# Patient Record
Sex: MALE | Race: WHITE | NOT HISPANIC OR LATINO | Employment: FULL TIME | URBAN - METROPOLITAN AREA
[De-identification: names, ages, dates, MRNs, and addresses within clinical notes are randomized per-mention and may not be internally consistent; named-entity substitution may affect disease eponyms.]

---

## 2019-01-18 ENCOUNTER — OFFICE VISIT (OUTPATIENT)
Dept: URGENT CARE | Facility: CLINIC | Age: 34
End: 2019-01-18
Payer: COMMERCIAL

## 2019-01-18 VITALS
HEIGHT: 73 IN | OXYGEN SATURATION: 96 % | DIASTOLIC BLOOD PRESSURE: 70 MMHG | TEMPERATURE: 98.7 F | HEART RATE: 80 BPM | RESPIRATION RATE: 16 BRPM | BODY MASS INDEX: 24.12 KG/M2 | SYSTOLIC BLOOD PRESSURE: 116 MMHG | WEIGHT: 182 LBS

## 2019-01-18 DIAGNOSIS — J02.0 STREP PHARYNGITIS: ICD-10-CM

## 2019-01-18 DIAGNOSIS — J10.1 INFLUENZA B: ICD-10-CM

## 2019-01-18 LAB
FLUAV+FLUBV AG SPEC QL IA: NORMAL
INT CON NEG: NORMAL
INT CON NEG: NORMAL
INT CON POS: NORMAL
INT CON POS: NORMAL
S PYO AG THROAT QL: POSITIVE

## 2019-01-18 PROCEDURE — 99203 OFFICE O/P NEW LOW 30 MIN: CPT | Performed by: PHYSICIAN ASSISTANT

## 2019-01-18 PROCEDURE — 87880 STREP A ASSAY W/OPTIC: CPT | Performed by: PHYSICIAN ASSISTANT

## 2019-01-18 PROCEDURE — 87804 INFLUENZA ASSAY W/OPTIC: CPT | Performed by: PHYSICIAN ASSISTANT

## 2019-01-18 RX ORDER — AMOXICILLIN 400 MG/5ML
POWDER, FOR SUSPENSION ORAL
Qty: 1 BOTTLE | Refills: 0 | Status: SHIPPED | OUTPATIENT
Start: 2019-01-18

## 2019-01-18 ASSESSMENT — ENCOUNTER SYMPTOMS
HEADACHES: 0
WHEEZING: 0
DIARRHEA: 0
VOMITING: 0
CHILLS: 1
EYE DISCHARGE: 0
SORE THROAT: 1
COUGH: 1
RHINORRHEA: 1
NECK PAIN: 0
FEVER: 1
DIZZINESS: 0
MYALGIAS: 1
EYE REDNESS: 0

## 2019-01-18 NOTE — PROGRESS NOTES
"Subjective:      Rafael Perla is a 33 y.o. male who presents with Cough (cough, bodyaches, post nasal drip, chills, sore throat x 4 days)            Patient is a pleasant 33-year-old male who presents to urgent care with sore throat, body aches, drainage and subjective fevers for the last 4-5 days.  Patient reports that most of his symptoms are tolerable other than his worsening sore throat at this time.  Patient's been taking over-the-counter DayQuil and cold formulations with minimal relief his symptoms.  He denies any ill contacts at this time and denies receiving his influenza vaccination.      Cough   This is a new problem. The current episode started in the past 7 days. The problem has been unchanged. The problem occurs every few minutes. The cough is non-productive. Associated symptoms include chills, a fever, myalgias, nasal congestion, postnasal drip, rhinorrhea and a sore throat. Pertinent negatives include no chest pain, eye redness, headaches, rash or wheezing. Nothing aggravates the symptoms. Treatments tried: As above.  There is no history of asthma or bronchitis.       Review of Systems   Constitutional: Positive for chills, fever and malaise/fatigue.   HENT: Positive for congestion, postnasal drip, rhinorrhea and sore throat. Negative for ear discharge.    Eyes: Negative for discharge and redness.   Respiratory: Positive for cough. Negative for wheezing.    Cardiovascular: Negative for chest pain.   Gastrointestinal: Negative for diarrhea and vomiting.   Genitourinary: Negative for dysuria.   Musculoskeletal: Positive for myalgias. Negative for neck pain.   Skin: Negative for itching and rash.   Neurological: Negative for dizziness and headaches.   All other systems reviewed and are negative.         Objective:     /70 (BP Location: Right arm, Patient Position: Sitting, BP Cuff Size: Adult)   Pulse 80   Temp 37.1 °C (98.7 °F) (Temporal)   Resp 16   Ht 1.854 m (6' 1\")   Wt 82.6 kg (182 lb)   " SpO2 96%   BMI 24.01 kg/m²    PMH:  has no past medical history on file.  MEDS: No current outpatient prescriptions on file.  ALLERGIES: No Known Allergies  SURGHX: No past surgical history on file.  SOCHX:  reports that he has never smoked. He has never used smokeless tobacco.  FH: Family history was reviewed, no pertinent findings to report    Physical Exam   Constitutional: He is oriented to person, place, and time. He appears well-developed and well-nourished.   HENT:   Head: Normocephalic and atraumatic.   Right Ear: External ear normal.   Left Ear: External ear normal.   Nose: Nose normal.   Mouth/Throat: No oropharyngeal exudate.   Posterior oropharynx with tonsillar edema and erythema without exudate.   Without evidence of abscess formation.    Eyes: Pupils are equal, round, and reactive to light. EOM are normal.   Neck: Normal range of motion. Neck supple. No thyromegaly present.   Cardiovascular: Normal rate and regular rhythm.    Pulmonary/Chest: Effort normal and breath sounds normal. No respiratory distress.   Musculoskeletal: Normal range of motion. He exhibits no edema.   Lymphadenopathy:     He has cervical adenopathy.   Neurological: He is alert and oriented to person, place, and time.   Skin: Skin is warm. No rash noted. No pallor.   Psychiatric: He has a normal mood and affect. His behavior is normal.   Vitals reviewed.            Strep and influenza- positive- Flu B.     Assessment/Plan:     1. Influenza B  - amoxicillin (AMOXIL) 400 MG/5ML suspension; Take 12.5 mL po  BID for 10 days  Dispense: 1 Bottle; Refill: 0    2. Strep pharyngitis  - amoxicillin (AMOXIL) 400 MG/5ML suspension; Take 12.5 mL po  BID for 10 days  Dispense: 1 Bottle; Refill: 0    Encouraged OTC supportive meds PRN. Humidification, increase fluids, avoid night time dairy.   Discussed the notable contagious nature of both illness at this time.  Encourage patient to keep healthy distance from other members of the family at this  time.  Change his toothbrush encouraged good hand hygiene.  Patient given precautionary s/sx that mandate immediate follow up and evaluation in the ED. Advised of risks of not doing so.    DDX, Supportive care, and indications for immediate follow-up discussed with patient.    Instructed to return to clinic or nearest emergency department if we are not available for any change in condition, further concerns, or worsening of symptoms.    The patient demonstrated a good understanding and agreed with the treatment plan.

## 2020-01-21 ENCOUNTER — APPOINTMENT (OUTPATIENT)
Dept: RADIOLOGY | Facility: MEDICAL CENTER | Age: 35
End: 2020-01-21
Attending: EMERGENCY MEDICINE
Payer: COMMERCIAL

## 2020-01-21 ENCOUNTER — HOSPITAL ENCOUNTER (EMERGENCY)
Facility: MEDICAL CENTER | Age: 35
End: 2020-01-21
Attending: EMERGENCY MEDICINE
Payer: COMMERCIAL

## 2020-01-21 VITALS
BODY MASS INDEX: 22.45 KG/M2 | TEMPERATURE: 97.3 F | HEART RATE: 69 BPM | WEIGHT: 180.56 LBS | OXYGEN SATURATION: 98 % | HEIGHT: 75 IN | DIASTOLIC BLOOD PRESSURE: 77 MMHG | SYSTOLIC BLOOD PRESSURE: 107 MMHG | RESPIRATION RATE: 16 BRPM

## 2020-01-21 DIAGNOSIS — M79.645 PAIN OF LEFT THUMB: ICD-10-CM

## 2020-01-21 PROCEDURE — 73140 X-RAY EXAM OF FINGER(S): CPT | Mod: LT

## 2020-01-21 PROCEDURE — 99283 EMERGENCY DEPT VISIT LOW MDM: CPT

## 2020-01-21 ASSESSMENT — PAIN DESCRIPTION - DESCRIPTORS: DESCRIPTORS: ACHING;BURNING
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
## 2020-01-21 NOTE — ED TRIAGE NOTES
.  Chief Complaint   Patient presents with   • Other     stung by sea urchin 1/9   • Hand Pain     left hand thumb     Ambulated to triage with above c.c. left thumb pain and swelling. Has not improved.

## 2021-05-23 ENCOUNTER — APPOINTMENT (OUTPATIENT)
Dept: RADIOLOGY | Facility: MEDICAL CENTER | Age: 36
DRG: 513 | End: 2021-05-23
Attending: EMERGENCY MEDICINE
Payer: COMMERCIAL

## 2021-05-23 ENCOUNTER — HOSPITAL ENCOUNTER (INPATIENT)
Facility: MEDICAL CENTER | Age: 36
LOS: 7 days | DRG: 513 | End: 2021-05-30
Attending: EMERGENCY MEDICINE | Admitting: SURGERY
Payer: COMMERCIAL

## 2021-05-23 DIAGNOSIS — S22.041A: ICD-10-CM

## 2021-05-23 DIAGNOSIS — S22.32XA CLOSED FRACTURE OF ONE RIB OF LEFT SIDE, INITIAL ENCOUNTER: ICD-10-CM

## 2021-05-23 DIAGNOSIS — S12.000A CLOSED DISPLACED FRACTURE OF FIRST CERVICAL VERTEBRA, UNSPECIFIED FRACTURE MORPHOLOGY, INITIAL ENCOUNTER (HCC): ICD-10-CM

## 2021-05-23 DIAGNOSIS — S62.91XA RIGHT HAND FRACTURE, CLOSED, INITIAL ENCOUNTER: ICD-10-CM

## 2021-05-23 DIAGNOSIS — S22.23XA: ICD-10-CM

## 2021-05-23 DIAGNOSIS — J93.9 PNEUMOTHORAX, UNSPECIFIED TYPE: ICD-10-CM

## 2021-05-23 DIAGNOSIS — T14.90XA TRAUMA: ICD-10-CM
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
## 2021-05-23 PROBLEM — R33.8 ACUTE URINARY RETENTION: Status: ACTIVE | Noted: 2021-05-23

## 2021-05-23 PROBLEM — S27.2XXA PNEUMOHEMOTHORAX, TRAUMATIC, INITIAL ENCOUNTER: Status: ACTIVE | Noted: 2021-05-23

## 2021-05-23 PROBLEM — S62.306A: Status: ACTIVE | Noted: 2021-05-23

## 2021-05-23 PROBLEM — S15.102A: Status: ACTIVE | Noted: 2021-05-23

## 2021-05-23 PROBLEM — Z75.8 DISCHARGE PLANNING ISSUES: Status: ACTIVE | Noted: 2021-05-23

## 2021-05-23 PROBLEM — S62.346A NONDISPLACED FRACTURE OF BASE OF FIFTH METACARPAL BONE, RIGHT HAND, INITIAL ENCOUNTER FOR CLOSED FRACTURE: Status: ACTIVE | Noted: 2021-05-23

## 2021-05-23 PROBLEM — S22.42XA TRAUMATIC FRACTURE OF RIBS WITH PNEUMOTHORAX, LEFT, CLOSED, INITIAL ENCOUNTER: Status: ACTIVE | Noted: 2021-05-23

## 2021-05-23 PROBLEM — Z11.9 SCREENING EXAMINATION FOR INFECTIOUS DISEASE: Status: ACTIVE | Noted: 2021-05-23

## 2021-05-23 PROBLEM — Z53.09 CONTRAINDICATION TO DEEP VEIN THROMBOSIS (DVT) PROPHYLAXIS: Status: ACTIVE | Noted: 2021-05-23

## 2021-05-23 PROBLEM — S22.21XA FRACTURE OF MANUBRIUM, INITIAL ENCOUNTER FOR CLOSED FRACTURE: Status: ACTIVE | Noted: 2021-05-23

## 2021-05-23 PROBLEM — S27.0XXA TRAUMATIC FRACTURE OF RIBS WITH PNEUMOTHORAX, LEFT, CLOSED, INITIAL ENCOUNTER: Status: ACTIVE | Noted: 2021-05-23

## 2021-05-23 PROBLEM — S12.9XXA: Status: ACTIVE | Noted: 2021-05-23

## 2021-05-23 PROBLEM — S22.009A MULT FRACTURES OF THORACIC SPINE, CLOSED, INITIAL ENCOUNTER (HCC): Status: ACTIVE | Noted: 2021-05-23

## 2021-05-23 PROBLEM — Z02.9 DISCHARGE PLANNING ISSUES: Status: ACTIVE | Noted: 2021-05-23

## 2021-05-23 LAB
ABO GROUP BLD: NORMAL
ALBUMIN SERPL BCP-MCNC: 4.3 G/DL (ref 3.2–4.9)
ALBUMIN/GLOB SERPL: 1.4 G/DL
ALP SERPL-CCNC: 71 U/L (ref 30–99)
ALT SERPL-CCNC: 26 U/L (ref 2–50)
ANION GAP SERPL CALC-SCNC: 13 MMOL/L (ref 7–16)
APTT PPP: 24.4 SEC (ref 24.7–36)
AST SERPL-CCNC: 35 U/L (ref 12–45)
BILIRUB SERPL-MCNC: 0.3 MG/DL (ref 0.1–1.5)
BLD GP AB SCN SERPL QL: NORMAL
BUN SERPL-MCNC: 12 MG/DL (ref 8–22)
CALCIUM SERPL-MCNC: 8.8 MG/DL (ref 8.5–10.5)
CHLORIDE SERPL-SCNC: 107 MMOL/L (ref 96–112)
CO2 SERPL-SCNC: 18 MMOL/L (ref 20–33)
CREAT SERPL-MCNC: 0.89 MG/DL (ref 0.5–1.4)
ERYTHROCYTE [DISTWIDTH] IN BLOOD BY AUTOMATED COUNT: 42.5 FL (ref 35.9–50)
ETHANOL BLD-MCNC: <10.1 MG/DL (ref 0–10)
GLOBULIN SER CALC-MCNC: 3 G/DL (ref 1.9–3.5)
GLUCOSE SERPL-MCNC: 124 MG/DL (ref 65–99)
HCT VFR BLD AUTO: 45.5 % (ref 42–52)
HGB BLD-MCNC: 15.4 G/DL (ref 14–18)
INR PPP: 0.99 (ref 0.87–1.13)
MCH RBC QN AUTO: 31.4 PG (ref 27–33)
MCHC RBC AUTO-ENTMCNC: 33.8 G/DL (ref 33.7–35.3)
MCV RBC AUTO: 92.9 FL (ref 81.4–97.8)
PLATELET # BLD AUTO: 301 K/UL (ref 164–446)
PMV BLD AUTO: 9.8 FL (ref 9–12.9)
POTASSIUM SERPL-SCNC: 3.8 MMOL/L (ref 3.6–5.5)
PROT SERPL-MCNC: 7.3 G/DL (ref 6–8.2)
PROTHROMBIN TIME: 13.4 SEC (ref 12–14.6)
RBC # BLD AUTO: 4.9 M/UL (ref 4.7–6.1)
RH BLD: NORMAL
SARS-COV+SARS-COV-2 AG RESP QL IA.RAPID: NOTDETECTED
SODIUM SERPL-SCNC: 138 MMOL/L (ref 135–145)
SPECIMEN SOURCE: NORMAL
WBC # BLD AUTO: 19 K/UL (ref 4.8–10.8)

## 2021-05-23 PROCEDURE — 96375 TX/PRO/DX INJ NEW DRUG ADDON: CPT

## 2021-05-23 PROCEDURE — 85610 PROTHROMBIN TIME: CPT

## 2021-05-23 PROCEDURE — 70450 CT HEAD/BRAIN W/O DYE: CPT

## 2021-05-23 PROCEDURE — 96374 THER/PROPH/DIAG INJ IV PUSH: CPT

## 2021-05-23 PROCEDURE — 700111 HCHG RX REV CODE 636 W/ 250 OVERRIDE (IP): Performed by: EMERGENCY MEDICINE

## 2021-05-23 PROCEDURE — 87426 SARSCOV CORONAVIRUS AG IA: CPT

## 2021-05-23 PROCEDURE — 73130 X-RAY EXAM OF HAND: CPT | Mod: RT

## 2021-05-23 PROCEDURE — L0200 CERV COL SUPP ADJ BAR & THOR: HCPCS

## 2021-05-23 PROCEDURE — 86850 RBC ANTIBODY SCREEN: CPT

## 2021-05-23 PROCEDURE — 70486 CT MAXILLOFACIAL W/O DYE: CPT

## 2021-05-23 PROCEDURE — 90715 TDAP VACCINE 7 YRS/> IM: CPT | Performed by: EMERGENCY MEDICINE

## 2021-05-23 PROCEDURE — 86901 BLOOD TYPING SEROLOGIC RH(D): CPT

## 2021-05-23 PROCEDURE — 72128 CT CHEST SPINE W/O DYE: CPT

## 2021-05-23 PROCEDURE — 700117 HCHG RX CONTRAST REV CODE 255: Performed by: EMERGENCY MEDICINE

## 2021-05-23 PROCEDURE — 700102 HCHG RX REV CODE 250 W/ 637 OVERRIDE(OP): Performed by: SURGERY

## 2021-05-23 PROCEDURE — 3E0234Z INTRODUCTION OF SERUM, TOXOID AND VACCINE INTO MUSCLE, PERCUTANEOUS APPROACH: ICD-10-PCS | Performed by: EMERGENCY MEDICINE

## 2021-05-23 PROCEDURE — A9270 NON-COVERED ITEM OR SERVICE: HCPCS | Performed by: SURGERY

## 2021-05-23 PROCEDURE — U0005 INFEC AGEN DETEC AMPLI PROBE: HCPCS

## 2021-05-23 PROCEDURE — 700105 HCHG RX REV CODE 258: Performed by: SURGERY

## 2021-05-23 PROCEDURE — 85027 COMPLETE CBC AUTOMATED: CPT

## 2021-05-23 PROCEDURE — G0390 TRAUMA RESPONS W/HOSP CRITI: HCPCS

## 2021-05-23 PROCEDURE — 85730 THROMBOPLASTIN TIME PARTIAL: CPT

## 2021-05-23 PROCEDURE — 80053 COMPREHEN METABOLIC PANEL: CPT

## 2021-05-23 PROCEDURE — 99291 CRITICAL CARE FIRST HOUR: CPT

## 2021-05-23 PROCEDURE — 82077 ASSAY SPEC XCP UR&BREATH IA: CPT

## 2021-05-23 PROCEDURE — 51798 US URINE CAPACITY MEASURE: CPT

## 2021-05-23 PROCEDURE — U0003 INFECTIOUS AGENT DETECTION BY NUCLEIC ACID (DNA OR RNA); SEVERE ACUTE RESPIRATORY SYNDROME CORONAVIRUS 2 (SARS-COV-2) (CORONAVIRUS DISEASE [COVID-19]), AMPLIFIED PROBE TECHNIQUE, MAKING USE OF HIGH THROUGHPUT TECHNOLOGIES AS DESCRIBED BY CMS-2020-01-R: HCPCS

## 2021-05-23 PROCEDURE — 306637 HCHG MISC ORTHO ITEM RC 0274

## 2021-05-23 PROCEDURE — 86900 BLOOD TYPING SEROLOGIC ABO: CPT

## 2021-05-23 PROCEDURE — 770022 HCHG ROOM/CARE - ICU (200)

## 2021-05-23 PROCEDURE — 700111 HCHG RX REV CODE 636 W/ 250 OVERRIDE (IP): Performed by: SURGERY

## 2021-05-23 PROCEDURE — 72131 CT LUMBAR SPINE W/O DYE: CPT

## 2021-05-23 PROCEDURE — 70498 CT ANGIOGRAPHY NECK: CPT

## 2021-05-23 PROCEDURE — 71045 X-RAY EXAM CHEST 1 VIEW: CPT

## 2021-05-23 PROCEDURE — 71260 CT THORAX DX C+: CPT

## 2021-05-23 PROCEDURE — 72125 CT NECK SPINE W/O DYE: CPT

## 2021-05-23 RX ORDER — BISACODYL 10 MG
10 SUPPOSITORY, RECTAL RECTAL
Status: DISCONTINUED | OUTPATIENT
Start: 2021-05-23 | End: 2021-05-30 | Stop reason: HOSPADM

## 2021-05-23 RX ORDER — ONDANSETRON 2 MG/ML
4 INJECTION INTRAMUSCULAR; INTRAVENOUS EVERY 4 HOURS PRN
Status: DISCONTINUED | OUTPATIENT
Start: 2021-05-23 | End: 2021-05-30 | Stop reason: HOSPADM

## 2021-05-23 RX ORDER — FAMOTIDINE 20 MG/1
20 TABLET, FILM COATED ORAL 2 TIMES DAILY
Status: DISCONTINUED | OUTPATIENT
Start: 2021-05-23 | End: 2021-05-28

## 2021-05-23 RX ORDER — POLYETHYLENE GLYCOL 3350 17 G/17G
1 POWDER, FOR SOLUTION ORAL 2 TIMES DAILY
Status: DISCONTINUED | OUTPATIENT
Start: 2021-05-23 | End: 2021-05-30 | Stop reason: HOSPADM

## 2021-05-23 RX ORDER — AMOXICILLIN 250 MG
1 CAPSULE ORAL NIGHTLY
Status: DISCONTINUED | OUTPATIENT
Start: 2021-05-23 | End: 2021-05-30 | Stop reason: HOSPADM

## 2021-05-23 RX ORDER — OXYCODONE HYDROCHLORIDE 5 MG/1
5 TABLET ORAL
Status: DISCONTINUED | OUTPATIENT
Start: 2021-05-23 | End: 2021-05-24

## 2021-05-23 RX ORDER — ENEMA 19; 7 G/133ML; G/133ML
1 ENEMA RECTAL
Status: DISCONTINUED | OUTPATIENT
Start: 2021-05-23 | End: 2021-05-30 | Stop reason: HOSPADM

## 2021-05-23 RX ORDER — ACETAMINOPHEN 500 MG
1000 TABLET ORAL EVERY 6 HOURS PRN
Status: DISCONTINUED | OUTPATIENT
Start: 2021-05-28 | End: 2021-05-30 | Stop reason: HOSPADM

## 2021-05-23 RX ORDER — ONDANSETRON 2 MG/ML
INJECTION INTRAMUSCULAR; INTRAVENOUS
Status: COMPLETED | OUTPATIENT
Start: 2021-05-23 | End: 2021-05-23

## 2021-05-23 RX ORDER — ACETAMINOPHEN 500 MG
1000 TABLET ORAL EVERY 6 HOURS
Status: DISPENSED | OUTPATIENT
Start: 2021-05-23 | End: 2021-05-28

## 2021-05-23 RX ORDER — DOCUSATE SODIUM 100 MG/1
100 CAPSULE, LIQUID FILLED ORAL 2 TIMES DAILY
Status: DISCONTINUED | OUTPATIENT
Start: 2021-05-23 | End: 2021-05-30 | Stop reason: HOSPADM

## 2021-05-23 RX ORDER — AMOXICILLIN 250 MG
1 CAPSULE ORAL
Status: DISCONTINUED | OUTPATIENT
Start: 2021-05-23 | End: 2021-05-30 | Stop reason: HOSPADM

## 2021-05-23 RX ORDER — OXYCODONE HYDROCHLORIDE 10 MG/1
10 TABLET ORAL
Status: DISCONTINUED | OUTPATIENT
Start: 2021-05-23 | End: 2021-05-24

## 2021-05-23 RX ORDER — DIAZEPAM 5 MG/1
5 TABLET ORAL EVERY 6 HOURS PRN
Status: DISCONTINUED | OUTPATIENT
Start: 2021-05-23 | End: 2021-05-24

## 2021-05-23 RX ORDER — SODIUM CHLORIDE, SODIUM LACTATE, POTASSIUM CHLORIDE, CALCIUM CHLORIDE 600; 310; 30; 20 MG/100ML; MG/100ML; MG/100ML; MG/100ML
INJECTION, SOLUTION INTRAVENOUS CONTINUOUS
Status: DISCONTINUED | OUTPATIENT
Start: 2021-05-23 | End: 2021-05-24

## 2021-05-23 RX ORDER — HYDROMORPHONE HYDROCHLORIDE 1 MG/ML
0.5 INJECTION, SOLUTION INTRAMUSCULAR; INTRAVENOUS; SUBCUTANEOUS
Status: DISCONTINUED | OUTPATIENT
Start: 2021-05-23 | End: 2021-05-24

## 2021-05-23 RX ORDER — MORPHINE SULFATE 4 MG/ML
INJECTION, SOLUTION INTRAMUSCULAR; INTRAVENOUS
Status: COMPLETED | OUTPATIENT
Start: 2021-05-23 | End: 2021-05-23

## 2021-05-23 RX ADMIN — ACETAMINOPHEN 1000 MG: 500 TABLET ORAL at 17:16

## 2021-05-23 RX ADMIN — ACETAMINOPHEN 1000 MG: 500 TABLET ORAL at 23:41

## 2021-05-23 RX ADMIN — ONDANSETRON 4 MG: 2 INJECTION INTRAMUSCULAR; INTRAVENOUS at 13:05

## 2021-05-23 RX ADMIN — MORPHINE SULFATE 4 MG: 4 INJECTION INTRAVENOUS at 13:05

## 2021-05-23 RX ADMIN — DIAZEPAM 5 MG: 5 TABLET ORAL at 17:17

## 2021-05-23 RX ADMIN — HYDROMORPHONE HYDROCHLORIDE 0.5 MG: 1 INJECTION, SOLUTION INTRAMUSCULAR; INTRAVENOUS; SUBCUTANEOUS at 20:41

## 2021-05-23 RX ADMIN — ONDANSETRON 4 MG: 2 INJECTION INTRAMUSCULAR; INTRAVENOUS at 17:35

## 2021-05-23 RX ADMIN — HYDROMORPHONE HYDROCHLORIDE 0.5 MG: 1 INJECTION, SOLUTION INTRAMUSCULAR; INTRAVENOUS; SUBCUTANEOUS at 17:17

## 2021-05-23 RX ADMIN — OXYCODONE HYDROCHLORIDE 10 MG: 10 TABLET ORAL at 15:00

## 2021-05-23 RX ADMIN — DOCUSATE SODIUM 50 MG AND SENNOSIDES 8.6 MG 1 TABLET: 8.6; 5 TABLET, FILM COATED ORAL at 20:41

## 2021-05-23 RX ADMIN — HYDROMORPHONE HYDROCHLORIDE 0.5 MG: 1 INJECTION, SOLUTION INTRAMUSCULAR; INTRAVENOUS; SUBCUTANEOUS at 14:13

## 2021-05-23 RX ADMIN — OXYCODONE HYDROCHLORIDE 10 MG: 10 TABLET ORAL at 23:41

## 2021-05-23 RX ADMIN — IOHEXOL 60 ML: 350 INJECTION, SOLUTION INTRAVENOUS at 14:52

## 2021-05-23 RX ADMIN — IOHEXOL 100 ML: 350 INJECTION, SOLUTION INTRAVENOUS at 13:25

## 2021-05-23 RX ADMIN — SODIUM CHLORIDE, POTASSIUM CHLORIDE, SODIUM LACTATE AND CALCIUM CHLORIDE: 600; 310; 30; 20 INJECTION, SOLUTION INTRAVENOUS at 22:05

## 2021-05-23 RX ADMIN — SODIUM CHLORIDE, POTASSIUM CHLORIDE, SODIUM LACTATE AND CALCIUM CHLORIDE: 600; 310; 30; 20 INJECTION, SOLUTION INTRAVENOUS at 15:00

## 2021-05-23 RX ADMIN — CLOSTRIDIUM TETANI TOXOID ANTIGEN (FORMALDEHYDE INACTIVATED), CORYNEBACTERIUM DIPHTHERIAE TOXOID ANTIGEN (FORMALDEHYDE INACTIVATED), BORDETELLA PERTUSSIS TOXOID ANTIGEN (GLUTARALDEHYDE INACTIVATED), BORDETELLA PERTUSSIS FILAMENTOUS HEMAGGLUTININ ANTIGEN (FORMALDEHYDE INACTIVATED), BORDETELLA PERTUSSIS PERTACTIN ANTIGEN, AND BORDETELLA PERTUSSIS FIMBRIAE 2/3 ANTIGEN 0.5 ML: 5; 2; 2.5; 5; 3; 5 INJECTION, SUSPENSION INTRAMUSCULAR at 15:51

## 2021-05-23 RX ADMIN — FAMOTIDINE 20 MG: 20 TABLET ORAL at 17:16

## 2021-05-23 ASSESSMENT — COGNITIVE AND FUNCTIONAL STATUS - GENERAL
TOILETING: TOTAL
MOVING TO AND FROM BED TO CHAIR: UNABLE
HELP NEEDED FOR BATHING: TOTAL
SUGGESTED CMS G CODE MODIFIER MOBILITY: CN
PERSONAL GROOMING: TOTAL
MOBILITY SCORE: 6
EATING MEALS: TOTAL
DAILY ACTIVITIY SCORE: 6
TURNING FROM BACK TO SIDE WHILE IN FLAT BAD: UNABLE
SUGGESTED CMS G CODE MODIFIER DAILY ACTIVITY: CN
STANDING UP FROM CHAIR USING ARMS: TOTAL
WALKING IN HOSPITAL ROOM: TOTAL
DRESSING REGULAR LOWER BODY CLOTHING: TOTAL
MOVING FROM LYING ON BACK TO SITTING ON SIDE OF FLAT BED: UNABLE
CLIMB 3 TO 5 STEPS WITH RAILING: TOTAL
DRESSING REGULAR UPPER BODY CLOTHING: TOTAL

## 2021-05-23 ASSESSMENT — ENCOUNTER SYMPTOMS
BACK PAIN: 1
CONSTITUTIONAL NEGATIVE: 1
NECK PAIN: 1
GASTROINTESTINAL NEGATIVE: 1
PSYCHIATRIC NEGATIVE: 1
EYES NEGATIVE: 1
RESPIRATORY NEGATIVE: 1
NEUROLOGICAL NEGATIVE: 1

## 2021-05-23 ASSESSMENT — LIFESTYLE VARIABLES
HAVE PEOPLE ANNOYED YOU BY CRITICIZING YOUR DRINKING: NO
EVER FELT BAD OR GUILTY ABOUT YOUR DRINKING: NO
EVER_SMOKED: NEVER
ON A TYPICAL DAY WHEN YOU DRINK ALCOHOL HOW MANY DRINKS DO YOU HAVE: 3
HAVE YOU EVER FELT YOU SHOULD CUT DOWN ON YOUR DRINKING: NO
TOTAL SCORE: 0
CONSUMPTION TOTAL: POSITIVE
AVERAGE NUMBER OF DAYS PER WEEK YOU HAVE A DRINK CONTAINING ALCOHOL: 3
HOW MANY TIMES IN THE PAST YEAR HAVE YOU HAD 5 OR MORE DRINKS IN A DAY: 10
EVER HAD A DRINK FIRST THING IN THE MORNING TO STEADY YOUR NERVES TO GET RID OF A HANGOVER: NO
TOTAL SCORE: 0
ALCOHOL_USE: YES
TOTAL SCORE: 0
DOES PATIENT WANT TO STOP DRINKING: NO

## 2021-05-23 ASSESSMENT — PATIENT HEALTH QUESTIONNAIRE - PHQ9
SUM OF ALL RESPONSES TO PHQ9 QUESTIONS 1 AND 2: 0
2. FEELING DOWN, DEPRESSED, IRRITABLE, OR HOPELESS: NOT AT ALL
1. LITTLE INTEREST OR PLEASURE IN DOING THINGS: NOT AT ALL

## 2021-05-23 ASSESSMENT — FIBROSIS 4 INDEX: FIB4 SCORE: 0.82

## 2021-05-23 ASSESSMENT — PAIN DESCRIPTION - PAIN TYPE
TYPE: ACUTE PAIN

## 2021-05-23 ASSESSMENT — COPD QUESTIONNAIRES
COPD SCREENING SCORE: 0
HAVE YOU SMOKED AT LEAST 100 CIGARETTES IN YOUR ENTIRE LIFE: NO/DON'T KNOW
DO YOU EVER COUGH UP ANY MUCUS OR PHLEGM?: NO/ONLY WITH OCCASIONAL COLDS OR INFECTIONS
DURING THE PAST 4 WEEKS HOW MUCH DID YOU FEEL SHORT OF BREATH: NONE/LITTLE OF THE TIME

## 2021-05-23 NOTE — ED NOTES
Pt transported to [place] [address] from CT scanner.  Hard C collar intact.  Pt c/o midline neck pain.  No numbness/tingling in all extremities.

## 2021-05-23 NOTE — PROGRESS NOTES
Will leave formal consult note  Will order  brace, patient needs to wear  at all times  Ok for q4h neurochecks and to mobilize once in brace

## 2021-05-23 NOTE — ASSESSMENT & PLAN NOTE
Prophylactic anticoagulation for thrombotic prevention initially contraindicated secondary to elevated bleeding risk.  5/24 Trauma screening bilateral lower extremity venous duplex negative for above knee DVT.   5/26 Prophylactic Lovenox initiated.

## 2021-05-23 NOTE — ASSESSMENT & PLAN NOTE
Nondisplaced manubrial fracture with mild retrosternal mediastinal hemorrhage.  Aggressive multimodal pain management and pulmonary hygiene. Serial chest radiographs.

## 2021-05-23 NOTE — PROGRESS NOTES
1550: traction at bedside for splinting of right hand    1600: RN contacted [facility], will be in within the next hour and a half for  placement.    1620: Dr Bojorquez notified of pt's muscle spasms. Orders received for valium 5 mg PO q6h PRN.

## 2021-05-23 NOTE — ASSESSMENT & PLAN NOTE
Nondisplaced left anterior second rib fracture.  Aggressive multimodal pain management and pulmonary hygiene. Serial chest radiographs.

## 2021-05-23 NOTE — CARE PLAN
Problem: Pain - Standard  Goal: Alleviation of pain or a reduction in pain to the patient’s comfort goal  Flowsheets (Taken 5/23/2021 6673)  Pain Rating Scale (NPRS): 8  Note: Patient medicated per MAR     Problem: Skin Integrity  Goal: Skin integrity is maintained or improved  Outcome: Progressing  Note: Patient turned q2h   The patient is Watcher - Medium risk of patient condition declining or worsening
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
## 2021-05-23 NOTE — ASSESSMENT & PLAN NOTE
Admission CTA imaging demonstrated focal irregularity of the distal left vertebral artery at the C1-C2 level.  No discrete flap identified.  Per neurosurgery normal variant and no dissection
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
## 2021-05-23 NOTE — ASSESSMENT & PLAN NOTE
Mildly angulated fifth metacarpal neck fracture.  Ulnar gutter splint.  5/27 Closed reduction and percutaneous intramedullary fixation of the right fifth metacarpal shaft.  Weight bearing status - Nonweightbearing PANDA Retana MD. Orthopedic Surgeon. [facility].

## 2021-05-23 NOTE — ASSESSMENT & PLAN NOTE
Fractures of the bilateral bilateral pedicles of T1.  Acute burst fracture of T4 with mild retropulsion and 50% height loss. There are associated fractures of the posterior elements as well.   Mild to moderate acute superior endplate compression fracture of T5.  Tiana SCANLON  at all times for 3 months, may wear [place] for showering.   Follow-up in clinic in 6 weeks with AP and lateral x-rays of his cervical and thoracic spine.  He will eventually need CT scans of the cervical and thoracic spine at 3 months prior to clearing his orthotic devices.  Jemal Alcantara MD. Neurosurgeon. [facility].

## 2021-05-23 NOTE — PROGRESS NOTES
Custom order was placed. To check the status of the order or if you have any questions, please call [facility] at [phone].

## 2021-05-23 NOTE — PROGRESS NOTES
Pt arrived to [place] [address] at 1452.   Temp: 97.0F   Weight: 190 lbs  C-spine, log roll precautions maintained during transition to ICU trauma bed.    2 RN skin check completed   -dried blood to nasal/oral areas  -tongue laceration  -right handed swelling and brusing
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
## 2021-05-23 NOTE — ASSESSMENT & PLAN NOTE
Acute mildly displaced fractures of the bilateral posterior arch of C1, acute comminuted displaced fractures of the body of C2 involving the bilateral articular facets, and bilateral transverse foramina, and acute mildly displaced fractures of the right pedicle of C7.  Admission CTA imaging demonstrated focal irregularity of the distal left vertebral artery at the C1-C2 level.  No discrete flap identified.  Non-operative management.   Tiana SCANLON  at all times for 3 months, may wear [place] for showering.  Follow-up in clinic in 6 weeks with AP and lateral x-rays of his cervical and thoracic spine.  He will eventually need CT scans of the cervical and thoracic spine at 3 months prior to clearing his orthotic devices.   Jemal Alcantara MD. Neurosurgeon. [facility].

## 2021-05-23 NOTE — ASSESSMENT & PLAN NOTE
Tiny left pneumothorax.  Chest tube not required at time of admission  5/24 Chest x-ray with small left apical pneumothorax    5/26 Pneumothorax increased in size. Chest tube placed.  5/28 CXR without pneumothorax. CT to water seal this afternoon.   5/29 CXR with tiny apical pneumothorax. CT clamped. Repeat CXR in 4-6 hours.   - Chest tube removed  5/30 No signs of pneumothorax.

## 2021-05-24 ENCOUNTER — APPOINTMENT (OUTPATIENT)
Dept: RADIOLOGY | Facility: MEDICAL CENTER | Age: 36
DRG: 513 | End: 2021-05-24
Attending: SURGERY
Payer: COMMERCIAL

## 2021-05-24 ENCOUNTER — HOSPITAL ENCOUNTER (INPATIENT)
Facility: REHABILITATION | Age: 36
End: 2021-05-24
Attending: PHYSICAL MEDICINE & REHABILITATION | Admitting: PHYSICAL MEDICINE & REHABILITATION
Payer: COMMERCIAL

## 2021-05-24 LAB
ABO + RH BLD: NORMAL
ALBUMIN SERPL BCP-MCNC: 3.7 G/DL (ref 3.2–4.9)
ALBUMIN/GLOB SERPL: 1.5 G/DL
ALP SERPL-CCNC: 57 U/L (ref 30–99)
ALT SERPL-CCNC: 18 U/L (ref 2–50)
ANION GAP SERPL CALC-SCNC: 8 MMOL/L (ref 7–16)
AST SERPL-CCNC: 25 U/L (ref 12–45)
BASOPHILS # BLD AUTO: 0.2 % (ref 0–1.8)
BASOPHILS # BLD: 0.02 K/UL (ref 0–0.12)
BILIRUB SERPL-MCNC: 0.7 MG/DL (ref 0.1–1.5)
BUN SERPL-MCNC: 8 MG/DL (ref 8–22)
CALCIUM SERPL-MCNC: 8.4 MG/DL (ref 8.5–10.5)
CHLORIDE SERPL-SCNC: 105 MMOL/L (ref 96–112)
CO2 SERPL-SCNC: 22 MMOL/L (ref 20–33)
CREAT SERPL-MCNC: 0.84 MG/DL (ref 0.5–1.4)
EOSINOPHIL # BLD AUTO: 0.02 K/UL (ref 0–0.51)
EOSINOPHIL NFR BLD: 0.2 % (ref 0–6.9)
ERYTHROCYTE [DISTWIDTH] IN BLOOD BY AUTOMATED COUNT: 42.7 FL (ref 35.9–50)
GLOBULIN SER CALC-MCNC: 2.5 G/DL (ref 1.9–3.5)
GLUCOSE SERPL-MCNC: 130 MG/DL (ref 65–99)
HCT VFR BLD AUTO: 39.8 % (ref 42–52)
HGB BLD-MCNC: 13.7 G/DL (ref 14–18)
IMM GRANULOCYTES # BLD AUTO: 0.07 K/UL (ref 0–0.11)
IMM GRANULOCYTES NFR BLD AUTO: 0.6 % (ref 0–0.9)
LYMPHOCYTES # BLD AUTO: 1.36 K/UL (ref 1–4.8)
LYMPHOCYTES NFR BLD: 11.4 % (ref 22–41)
MAGNESIUM SERPL-MCNC: 2 MG/DL (ref 1.5–2.5)
MCH RBC QN AUTO: 31.7 PG (ref 27–33)
MCHC RBC AUTO-ENTMCNC: 34.4 G/DL (ref 33.7–35.3)
MCV RBC AUTO: 92.1 FL (ref 81.4–97.8)
MONOCYTES # BLD AUTO: 1.11 K/UL (ref 0–0.85)
MONOCYTES NFR BLD AUTO: 9.3 % (ref 0–13.4)
NEUTROPHILS # BLD AUTO: 9.35 K/UL (ref 1.82–7.42)
NEUTROPHILS NFR BLD: 78.3 % (ref 44–72)
NRBC # BLD AUTO: 0 K/UL
NRBC BLD-RTO: 0 /100 WBC
PHOSPHATE SERPL-MCNC: 3.1 MG/DL (ref 2.5–4.5)
PLATELET # BLD AUTO: 218 K/UL (ref 164–446)
PMV BLD AUTO: 10.2 FL (ref 9–12.9)
POTASSIUM SERPL-SCNC: 3.7 MMOL/L (ref 3.6–5.5)
PROT SERPL-MCNC: 6.2 G/DL (ref 6–8.2)
RBC # BLD AUTO: 4.32 M/UL (ref 4.7–6.1)
SARS-COV-2 RNA RESP QL NAA+PROBE: NOTDETECTED
SODIUM SERPL-SCNC: 135 MMOL/L (ref 135–145)
SPECIMEN SOURCE: NORMAL
WBC # BLD AUTO: 11.9 K/UL (ref 4.8–10.8)

## 2021-05-24 PROCEDURE — 700111 HCHG RX REV CODE 636 W/ 250 OVERRIDE (IP): Performed by: NURSE PRACTITIONER

## 2021-05-24 PROCEDURE — 700102 HCHG RX REV CODE 250 W/ 637 OVERRIDE(OP): Performed by: SURGERY

## 2021-05-24 PROCEDURE — 97535 SELF CARE MNGMENT TRAINING: CPT

## 2021-05-24 PROCEDURE — 85025 COMPLETE CBC W/AUTO DIFF WBC: CPT

## 2021-05-24 PROCEDURE — 700102 HCHG RX REV CODE 250 W/ 637 OVERRIDE(OP): Performed by: PHYSICAL MEDICINE & REHABILITATION

## 2021-05-24 PROCEDURE — A9270 NON-COVERED ITEM OR SERVICE: HCPCS | Performed by: PHYSICAL MEDICINE & REHABILITATION

## 2021-05-24 PROCEDURE — 700111 HCHG RX REV CODE 636 W/ 250 OVERRIDE (IP): Performed by: SURGERY

## 2021-05-24 PROCEDURE — 83735 ASSAY OF MAGNESIUM: CPT

## 2021-05-24 PROCEDURE — 700102 HCHG RX REV CODE 250 W/ 637 OVERRIDE(OP): Performed by: NURSE PRACTITIONER

## 2021-05-24 PROCEDURE — 80053 COMPREHEN METABOLIC PANEL: CPT

## 2021-05-24 PROCEDURE — 99223 1ST HOSP IP/OBS HIGH 75: CPT | Performed by: PHYSICAL MEDICINE & REHABILITATION

## 2021-05-24 PROCEDURE — 700105 HCHG RX REV CODE 258: Performed by: SURGERY

## 2021-05-24 PROCEDURE — 71045 X-RAY EXAM CHEST 1 VIEW: CPT

## 2021-05-24 PROCEDURE — 99233 SBSQ HOSP IP/OBS HIGH 50: CPT | Performed by: SURGERY

## 2021-05-24 PROCEDURE — A9270 NON-COVERED ITEM OR SERVICE: HCPCS | Performed by: NURSE PRACTITIONER

## 2021-05-24 PROCEDURE — 84100 ASSAY OF PHOSPHORUS: CPT

## 2021-05-24 PROCEDURE — 770022 HCHG ROOM/CARE - ICU (200)

## 2021-05-24 PROCEDURE — 97162 PT EVAL MOD COMPLEX 30 MIN: CPT

## 2021-05-24 PROCEDURE — A9270 NON-COVERED ITEM OR SERVICE: HCPCS | Performed by: SURGERY

## 2021-05-24 PROCEDURE — 97166 OT EVAL MOD COMPLEX 45 MIN: CPT

## 2021-05-24 PROCEDURE — 93970 EXTREMITY STUDY: CPT

## 2021-05-24 RX ORDER — GABAPENTIN 100 MG/1
200 CAPSULE ORAL 3 TIMES DAILY
Status: DISCONTINUED | OUTPATIENT
Start: 2021-05-24 | End: 2021-05-24

## 2021-05-24 RX ORDER — MORPHINE SULFATE 15 MG/1
15 TABLET, FILM COATED, EXTENDED RELEASE ORAL EVERY 12 HOURS
Status: DISCONTINUED | OUTPATIENT
Start: 2021-05-24 | End: 2021-05-30 | Stop reason: HOSPADM

## 2021-05-24 RX ORDER — OXYCODONE HYDROCHLORIDE 5 MG/1
5-15 TABLET ORAL
Status: DISCONTINUED | OUTPATIENT
Start: 2021-05-24 | End: 2021-05-24

## 2021-05-24 RX ORDER — ASPIRIN 325 MG
325 TABLET ORAL DAILY
Status: DISCONTINUED | OUTPATIENT
Start: 2021-05-24 | End: 2021-05-27

## 2021-05-24 RX ORDER — METHOCARBAMOL 500 MG/1
500 TABLET, FILM COATED ORAL 4 TIMES DAILY
Status: DISCONTINUED | OUTPATIENT
Start: 2021-05-24 | End: 2021-05-30 | Stop reason: HOSPADM

## 2021-05-24 RX ORDER — HYDROMORPHONE HYDROCHLORIDE 1 MG/ML
.5-1 INJECTION, SOLUTION INTRAMUSCULAR; INTRAVENOUS; SUBCUTANEOUS
Status: DISCONTINUED | OUTPATIENT
Start: 2021-05-24 | End: 2021-05-26

## 2021-05-24 RX ADMIN — DOCUSATE SODIUM 100 MG: 100 CAPSULE, LIQUID FILLED ORAL at 05:36

## 2021-05-24 RX ADMIN — OXYCODONE HYDROCHLORIDE 10 MG: 10 TABLET ORAL at 03:57

## 2021-05-24 RX ADMIN — METHOCARBAMOL 500 MG: 500 TABLET ORAL at 13:39

## 2021-05-24 RX ADMIN — METHOCARBAMOL 500 MG: 500 TABLET ORAL at 20:21

## 2021-05-24 RX ADMIN — ACETAMINOPHEN 1000 MG: 500 TABLET ORAL at 05:36

## 2021-05-24 RX ADMIN — ONDANSETRON 4 MG: 2 INJECTION INTRAMUSCULAR; INTRAVENOUS at 21:10

## 2021-05-24 RX ADMIN — ASPIRIN 325 MG ORAL TABLET 325 MG: 325 PILL ORAL at 10:51

## 2021-05-24 RX ADMIN — HYDROMORPHONE HYDROCHLORIDE 0.5 MG: 1 INJECTION, SOLUTION INTRAMUSCULAR; INTRAVENOUS; SUBCUTANEOUS at 02:11

## 2021-05-24 RX ADMIN — METHOCARBAMOL 500 MG: 500 TABLET ORAL at 17:26

## 2021-05-24 RX ADMIN — DOCUSATE SODIUM 50 MG AND SENNOSIDES 8.6 MG 1 TABLET: 8.6; 5 TABLET, FILM COATED ORAL at 20:21

## 2021-05-24 RX ADMIN — HYDROMORPHONE HYDROCHLORIDE 1 MG: 1 INJECTION, SOLUTION INTRAMUSCULAR; INTRAVENOUS; SUBCUTANEOUS at 05:36

## 2021-05-24 RX ADMIN — DIAZEPAM 5 MG: 5 TABLET ORAL at 06:28

## 2021-05-24 RX ADMIN — DIAZEPAM 5 MG: 5 TABLET ORAL at 00:14

## 2021-05-24 RX ADMIN — MORPHINE SULFATE 15 MG: 15 TABLET, FILM COATED, EXTENDED RELEASE ORAL at 17:26

## 2021-05-24 RX ADMIN — ACETAMINOPHEN 1000 MG: 500 TABLET ORAL at 17:26

## 2021-05-24 RX ADMIN — FAMOTIDINE 20 MG: 20 TABLET ORAL at 17:27

## 2021-05-24 RX ADMIN — GABAPENTIN 200 MG: 100 CAPSULE ORAL at 05:36

## 2021-05-24 RX ADMIN — HYDROMORPHONE HYDROCHLORIDE 0.5 MG: 1 INJECTION, SOLUTION INTRAMUSCULAR; INTRAVENOUS; SUBCUTANEOUS at 01:50

## 2021-05-24 RX ADMIN — SODIUM CHLORIDE, POTASSIUM CHLORIDE, SODIUM LACTATE AND CALCIUM CHLORIDE: 600; 310; 30; 20 INJECTION, SOLUTION INTRAVENOUS at 05:48

## 2021-05-24 RX ADMIN — HYDROMORPHONE HYDROCHLORIDE 1 MG: 1 INJECTION, SOLUTION INTRAMUSCULAR; INTRAVENOUS; SUBCUTANEOUS at 10:45

## 2021-05-24 RX ADMIN — DOCUSATE SODIUM 100 MG: 100 CAPSULE, LIQUID FILLED ORAL at 17:26

## 2021-05-24 RX ADMIN — FAMOTIDINE 20 MG: 20 TABLET ORAL at 05:36

## 2021-05-24 RX ADMIN — OXYCODONE 15 MG: 5 TABLET ORAL at 08:19

## 2021-05-24 ASSESSMENT — GAIT ASSESSMENTS
DEVIATION: BRADYKINETIC
DISTANCE (FEET): 300

## 2021-05-24 ASSESSMENT — ENCOUNTER SYMPTOMS
NEUROLOGICAL NEGATIVE: 1
EYES NEGATIVE: 1
SHORTNESS OF BREATH: 0
CONSTITUTIONAL NEGATIVE: 1

## 2021-05-24 ASSESSMENT — FIBROSIS 4 INDEX: FIB4 SCORE: 1.13

## 2021-05-24 ASSESSMENT — COGNITIVE AND FUNCTIONAL STATUS - GENERAL
MOVING FROM LYING ON BACK TO SITTING ON SIDE OF FLAT BED: A LITTLE
TOILETING: A LOT
SUGGESTED CMS G CODE MODIFIER DAILY ACTIVITY: CK
TURNING FROM BACK TO SIDE WHILE IN FLAT BAD: A LITTLE
PERSONAL GROOMING: A LITTLE
STANDING UP FROM CHAIR USING ARMS: A LITTLE
HELP NEEDED FOR BATHING: A LOT
MOVING TO AND FROM BED TO CHAIR: UNABLE
WALKING IN HOSPITAL ROOM: A LITTLE
SUGGESTED CMS G CODE MODIFIER MOBILITY: CK
CLIMB 3 TO 5 STEPS WITH RAILING: A LITTLE
DRESSING REGULAR UPPER BODY CLOTHING: A LOT
DRESSING REGULAR LOWER BODY CLOTHING: A LOT
DAILY ACTIVITIY SCORE: 14
EATING MEALS: A LITTLE
MOBILITY SCORE: 16

## 2021-05-24 ASSESSMENT — PAIN DESCRIPTION - PAIN TYPE
TYPE: ACUTE PAIN

## 2021-05-24 ASSESSMENT — ACTIVITIES OF DAILY LIVING (ADL): TOILETING: INDEPENDENT
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
## 2021-05-24 NOTE — PROGRESS NOTES
"Orthopaedic Progress Note    Author: Christian Odom P.A.-C. Date & Time created: 5/24/2021   3:31 PM     Interval Events:  Patient doing well  Dr Hernandez to see patient tomorrow and likely fix hand thursday    Review of Systems   Constitutional: Negative.    Eyes: Negative.    Respiratory: Negative for shortness of breath.    Cardiovascular: Positive for chest pain (rib fx).   Musculoskeletal:        Pain well controlled DNVI   Neurological: Negative.      Hemodynamics:  /81   Pulse 94   Temp 36.8 °C (98.2 °F) (Temporal)   Resp (!) 29   Ht 1.905 m (6' 3\")   Wt 86.5 kg (190 lb 11.2 oz)   SpO2 91%      No Active Precaution Orders    Respiratory:    Respiration: (!) 29, Pulse Oximetry: 91 %     Work Of Breathing / Effort: Shallow;Mild  RUL Breath Sounds: Diminished, RML Breath Sounds: Diminished, RLL Breath Sounds: Diminished, TOSHA Breath Sounds: Diminished, LLL Breath Sounds: Diminished  Fluids:    Intake/Output Summary (Last 24 hours) at 5/24/2021 1531  Last data filed at 5/24/2021 1200  Gross per 24 hour   Intake 3485 ml   Output 1440 ml   Net 2045 ml     Admit Weight: 90.7 kg (200 lb)  Current Weight: 86.5 kg (190 lb 11.2 oz)    Physical Exam   Constitutional: No distress.   HENT:   Head: Normocephalic.   Eyes: Conjunctivae are normal.   Cardiovascular: Normal rate and regular rhythm.   Abdominal: He exhibits no distension. There is no abdominal tenderness.   Musculoskeletal:      Comments: RUE in short arm splint CDI, DNVI, moves all fingers, cap refill <2 sec.    Neurological: He is alert.   Skin: He is not diaphoretic.     Labs:  Recent Labs     05/23/21  1255 05/24/21  0555   WBC 19.0* 11.9*   RBC 4.90 4.32*   HEMOGLOBIN 15.4 13.7*   HEMATOCRIT 45.5 39.8*   MCV 92.9 92.1   MCH 31.4 31.7   MCHC 33.8 34.4   RDW 42.5 42.7   PLATELETCT 301 218   MPV 9.8 10.2     Recent Labs     05/23/21  1255 05/24/21  0555   SODIUM 138 135   POTASSIUM 3.8 3.7   CHLORIDE 107 105   CO2 18* 22   GLUCOSE 124* 130*   BUN 12 " 8   CREATININE 0.89 0.84   CALCIUM 8.8 8.4*       Medical Decision Making/Problem List:    Active Hospital Problems    Diagnosis    • Pneumohemothorax, traumatic, initial encounter [S27.2XXA]    • Fracture of one rib, left side, initial encounter for closed fracture [S22.32XA]    • Fracture of manubrium, initial encounter for closed fracture [S22.21XA]    • Mult fractures of thoracic spine, closed, initial encounter ([facility]) [S22.009A]    • Multiple fractures of cervical spine, initial encounter ([facility]) [S12.9XXA]    • Injury of vertebral artery, left, initial encounter [S15.102A]    • Contraindication to deep vein thrombosis (DVT) prophylaxis [Z53.09]    • Screening examination for infectious disease [Z11.9]    • Closed fracture of fifth metacarpal bone of right hand, initial encounter [S62.306A]    • Acute urinary retention [R33.8]    • Discharge planning issues [Z02.9]    • Trauma [T14.90XA]      Core Measures & Quality Metrics:  Current DVT prophylaxis: per trauma  Discussed patient condition with RN, Patient and orthopedics.  Clearance for lovenox/heparin: per trauma  Weight Bearing Status: NWB RUE at hand  Wounds & Drains: splint left in place  Disposition and Follow-up: per therapy recs
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
## 2021-05-24 NOTE — PROGRESS NOTES
Monitor Summary:    IA: 0.14  QRS: 0.105 disagree with strip (reprinted and have 0.09) monitor techs notified   QT: 0.375

## 2021-05-24 NOTE — CARE PLAN
Problem: Pain - Standard  Goal: Alleviation of pain or a reduction in pain to the patient’s comfort goal  Outcome: Progressing  Note: Pt educated to use 0-10 pain scale for PRN pain medications; administered as per MD orders.       Problem: Skin Integrity  Goal: Skin integrity is maintained or improved  Outcome: Progressing  Note: Performing Luis A Skin Risk assessment every shift or more to maintain or improve skin integrity. Any signs of skin breakdown are being documented per hospital policy. Utilizing barrier wipes, cream, and moisturizer when need to help prevent skin breakdown. Pt mobilizing as per MD orders.     The patient is Watcher - Medium risk of patient condition declining or worsening    Shift Goals  Clinical Goals: Pain control, void, repositioning  Patient Goals: Pulmonary hygiene, pain control    Progress made toward(s) clinical / shift goals:  Pain control and mobility

## 2021-05-24 NOTE — CARE PLAN
Problem: Pain - Standard  Goal: Alleviation of pain or a reduction in pain to the patient’s comfort goal  Outcome: Not Progressing   Patient complains of severe pain despite interventions  Problem: Skin Integrity  Goal: Skin integrity is maintained or improved  Outcome: Progressing   Patient shows no signs of skin breakdown
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
## 2021-05-24 NOTE — PROGRESS NOTES
1715: Dr Bojorquez and Dr Akers at bedside. MRI no longer needed at this time.     1720: Ortho pro tech at bedside for  placement.    1800: OK to advance diet to regular per Dr Bojorquez.    Bladder scan done, found 525 mL. Patient would like to try voiding in urinal at this time. Will reassess need for catheterization.
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
## 2021-05-25 ENCOUNTER — APPOINTMENT (OUTPATIENT)
Dept: RADIOLOGY | Facility: MEDICAL CENTER | Age: 36
DRG: 513 | End: 2021-05-25
Attending: NURSE PRACTITIONER
Payer: COMMERCIAL

## 2021-05-25 ENCOUNTER — APPOINTMENT (OUTPATIENT)
Dept: RADIOLOGY | Facility: MEDICAL CENTER | Age: 36
DRG: 513 | End: 2021-05-25
Attending: SURGERY
Payer: COMMERCIAL

## 2021-05-25 LAB
ALBUMIN SERPL BCP-MCNC: 3.5 G/DL (ref 3.2–4.9)
ALBUMIN/GLOB SERPL: 1.2 G/DL
ALP SERPL-CCNC: 58 U/L (ref 30–99)
ALT SERPL-CCNC: 20 U/L (ref 2–50)
ANION GAP SERPL CALC-SCNC: 10 MMOL/L (ref 7–16)
AST SERPL-CCNC: 28 U/L (ref 12–45)
BASOPHILS # BLD AUTO: 0.3 % (ref 0–1.8)
BASOPHILS # BLD: 0.03 K/UL (ref 0–0.12)
BILIRUB SERPL-MCNC: 0.6 MG/DL (ref 0.1–1.5)
BUN SERPL-MCNC: 5 MG/DL (ref 8–22)
CALCIUM SERPL-MCNC: 8.5 MG/DL (ref 8.5–10.5)
CHLORIDE SERPL-SCNC: 106 MMOL/L (ref 96–112)
CO2 SERPL-SCNC: 23 MMOL/L (ref 20–33)
CREAT SERPL-MCNC: 0.72 MG/DL (ref 0.5–1.4)
EOSINOPHIL # BLD AUTO: 0.11 K/UL (ref 0–0.51)
EOSINOPHIL NFR BLD: 1.2 % (ref 0–6.9)
ERYTHROCYTE [DISTWIDTH] IN BLOOD BY AUTOMATED COUNT: 42.5 FL (ref 35.9–50)
GLOBULIN SER CALC-MCNC: 2.9 G/DL (ref 1.9–3.5)
GLUCOSE SERPL-MCNC: 114 MG/DL (ref 65–99)
HCT VFR BLD AUTO: 39 % (ref 42–52)
HGB BLD-MCNC: 13.3 G/DL (ref 14–18)
IMM GRANULOCYTES # BLD AUTO: 0.03 K/UL (ref 0–0.11)
IMM GRANULOCYTES NFR BLD AUTO: 0.3 % (ref 0–0.9)
LYMPHOCYTES # BLD AUTO: 1.39 K/UL (ref 1–4.8)
LYMPHOCYTES NFR BLD: 15.3 % (ref 22–41)
MCH RBC QN AUTO: 31.7 PG (ref 27–33)
MCHC RBC AUTO-ENTMCNC: 34.1 G/DL (ref 33.7–35.3)
MCV RBC AUTO: 92.9 FL (ref 81.4–97.8)
MONOCYTES # BLD AUTO: 0.91 K/UL (ref 0–0.85)
MONOCYTES NFR BLD AUTO: 10 % (ref 0–13.4)
NEUTROPHILS # BLD AUTO: 6.62 K/UL (ref 1.82–7.42)
NEUTROPHILS NFR BLD: 72.9 % (ref 44–72)
NRBC # BLD AUTO: 0 K/UL
NRBC BLD-RTO: 0 /100 WBC
PLATELET # BLD AUTO: 183 K/UL (ref 164–446)
PMV BLD AUTO: 10.4 FL (ref 9–12.9)
POTASSIUM SERPL-SCNC: 3.8 MMOL/L (ref 3.6–5.5)
PROT SERPL-MCNC: 6.4 G/DL (ref 6–8.2)
RBC # BLD AUTO: 4.2 M/UL (ref 4.7–6.1)
SODIUM SERPL-SCNC: 139 MMOL/L (ref 135–145)
WBC # BLD AUTO: 9.1 K/UL (ref 4.8–10.8)

## 2021-05-25 PROCEDURE — A9270 NON-COVERED ITEM OR SERVICE: HCPCS | Performed by: SURGERY

## 2021-05-25 PROCEDURE — 700102 HCHG RX REV CODE 250 W/ 637 OVERRIDE(OP): Performed by: PHYSICAL MEDICINE & REHABILITATION

## 2021-05-25 PROCEDURE — 99232 SBSQ HOSP IP/OBS MODERATE 35: CPT | Performed by: PHYSICAL MEDICINE & REHABILITATION

## 2021-05-25 PROCEDURE — 700111 HCHG RX REV CODE 636 W/ 250 OVERRIDE (IP): Performed by: SURGERY

## 2021-05-25 PROCEDURE — 85025 COMPLETE CBC W/AUTO DIFF WBC: CPT

## 2021-05-25 PROCEDURE — 700102 HCHG RX REV CODE 250 W/ 637 OVERRIDE(OP): Performed by: SURGERY

## 2021-05-25 PROCEDURE — 99232 SBSQ HOSP IP/OBS MODERATE 35: CPT | Performed by: SURGERY

## 2021-05-25 PROCEDURE — 770022 HCHG ROOM/CARE - ICU (200)

## 2021-05-25 PROCEDURE — 700111 HCHG RX REV CODE 636 W/ 250 OVERRIDE (IP): Performed by: NURSE PRACTITIONER

## 2021-05-25 PROCEDURE — 97530 THERAPEUTIC ACTIVITIES: CPT

## 2021-05-25 PROCEDURE — 700101 HCHG RX REV CODE 250: Performed by: NURSE PRACTITIONER

## 2021-05-25 PROCEDURE — 80053 COMPREHEN METABOLIC PANEL: CPT

## 2021-05-25 PROCEDURE — 97535 SELF CARE MNGMENT TRAINING: CPT

## 2021-05-25 PROCEDURE — A9270 NON-COVERED ITEM OR SERVICE: HCPCS | Performed by: PHYSICAL MEDICINE & REHABILITATION

## 2021-05-25 PROCEDURE — 71045 X-RAY EXAM CHEST 1 VIEW: CPT

## 2021-05-25 RX ORDER — GABAPENTIN 100 MG/1
100 CAPSULE ORAL 3 TIMES DAILY
Status: DISCONTINUED | OUTPATIENT
Start: 2021-05-25 | End: 2021-05-25

## 2021-05-25 RX ORDER — LIDOCAINE 50 MG/G
1 PATCH TOPICAL EVERY 24 HOURS
Status: DISCONTINUED | OUTPATIENT
Start: 2021-05-25 | End: 2021-05-26

## 2021-05-25 RX ORDER — PROMETHAZINE HYDROCHLORIDE 25 MG/1
25 SUPPOSITORY RECTAL EVERY 6 HOURS PRN
Status: DISCONTINUED | OUTPATIENT
Start: 2021-05-25 | End: 2021-05-30 | Stop reason: HOSPADM

## 2021-05-25 RX ADMIN — ACETAMINOPHEN 1000 MG: 500 TABLET ORAL at 05:41

## 2021-05-25 RX ADMIN — FAMOTIDINE 20 MG: 20 TABLET ORAL at 05:41

## 2021-05-25 RX ADMIN — DOCUSATE SODIUM 50 MG AND SENNOSIDES 8.6 MG 1 TABLET: 8.6; 5 TABLET, FILM COATED ORAL at 19:51

## 2021-05-25 RX ADMIN — DOCUSATE SODIUM 100 MG: 100 CAPSULE, LIQUID FILLED ORAL at 17:53

## 2021-05-25 RX ADMIN — PROMETHAZINE HYDROCHLORIDE 25 MG: 25 SUPPOSITORY RECTAL at 10:32

## 2021-05-25 RX ADMIN — METHOCARBAMOL 500 MG: 500 TABLET ORAL at 11:25

## 2021-05-25 RX ADMIN — ONDANSETRON 4 MG: 2 INJECTION INTRAMUSCULAR; INTRAVENOUS at 08:24

## 2021-05-25 RX ADMIN — MORPHINE SULFATE 15 MG: 15 TABLET, FILM COATED, EXTENDED RELEASE ORAL at 05:41

## 2021-05-25 RX ADMIN — POLYETHYLENE GLYCOL 3350 1 PACKET: 17 POWDER, FOR SOLUTION ORAL at 05:41

## 2021-05-25 RX ADMIN — ACETAMINOPHEN 1000 MG: 500 TABLET ORAL at 00:09

## 2021-05-25 RX ADMIN — LIDOCAINE 1 PATCH: 50 PATCH TOPICAL at 13:07

## 2021-05-25 RX ADMIN — FAMOTIDINE 20 MG: 20 TABLET ORAL at 17:52

## 2021-05-25 RX ADMIN — MORPHINE SULFATE 15 MG: 15 TABLET, FILM COATED, EXTENDED RELEASE ORAL at 17:53

## 2021-05-25 RX ADMIN — POLYETHYLENE GLYCOL 3350 1 PACKET: 17 POWDER, FOR SOLUTION ORAL at 17:53

## 2021-05-25 RX ADMIN — HYDROMORPHONE HYDROCHLORIDE 0.5 MG: 1 INJECTION, SOLUTION INTRAMUSCULAR; INTRAVENOUS; SUBCUTANEOUS at 02:20

## 2021-05-25 RX ADMIN — HYDROMORPHONE HYDROCHLORIDE 0.5 MG: 1 INJECTION, SOLUTION INTRAMUSCULAR; INTRAVENOUS; SUBCUTANEOUS at 19:50

## 2021-05-25 RX ADMIN — ACETAMINOPHEN 1000 MG: 500 TABLET ORAL at 17:52

## 2021-05-25 RX ADMIN — HYDROMORPHONE HYDROCHLORIDE 0.5 MG: 1 INJECTION, SOLUTION INTRAMUSCULAR; INTRAVENOUS; SUBCUTANEOUS at 22:52

## 2021-05-25 RX ADMIN — METHOCARBAMOL 500 MG: 500 TABLET ORAL at 17:52

## 2021-05-25 RX ADMIN — ACETAMINOPHEN 1000 MG: 500 TABLET ORAL at 11:25

## 2021-05-25 RX ADMIN — ASPIRIN 325 MG ORAL TABLET 325 MG: 325 PILL ORAL at 05:41

## 2021-05-25 RX ADMIN — HYDROMORPHONE HYDROCHLORIDE 0.5 MG: 1 INJECTION, SOLUTION INTRAMUSCULAR; INTRAVENOUS; SUBCUTANEOUS at 15:11

## 2021-05-25 RX ADMIN — METHOCARBAMOL 500 MG: 500 TABLET ORAL at 19:51

## 2021-05-25 RX ADMIN — DOCUSATE SODIUM 100 MG: 100 CAPSULE, LIQUID FILLED ORAL at 05:41

## 2021-05-25 ASSESSMENT — COGNITIVE AND FUNCTIONAL STATUS - GENERAL
CLIMB 3 TO 5 STEPS WITH RAILING: A LOT
MOVING FROM LYING ON BACK TO SITTING ON SIDE OF FLAT BED: A LITTLE
TURNING FROM BACK TO SIDE WHILE IN FLAT BAD: UNABLE
MOVING TO AND FROM BED TO CHAIR: UNABLE
WALKING IN HOSPITAL ROOM: A LITTLE
SUGGESTED CMS G CODE MODIFIER MOBILITY: CL
STANDING UP FROM CHAIR USING ARMS: A LITTLE
MOBILITY SCORE: 13

## 2021-05-25 ASSESSMENT — PAIN DESCRIPTION - PAIN TYPE
TYPE: ACUTE PAIN

## 2021-05-25 ASSESSMENT — ENCOUNTER SYMPTOMS
NAUSEA: 1
NEUROLOGICAL NEGATIVE: 1
PSYCHIATRIC NEGATIVE: 1
VOMITING: 1
EYES NEGATIVE: 1
CONSTITUTIONAL NEGATIVE: 1
BACK PAIN: 1
NECK PAIN: 1
RESPIRATORY NEGATIVE: 1
SHORTNESS OF BREATH: 0

## 2021-05-25 ASSESSMENT — GAIT ASSESSMENTS
DEVIATION: BRADYKINETIC
GAIT LEVEL OF ASSIST: MINIMAL ASSIST
DISTANCE (FEET): 10

## 2021-05-25 ASSESSMENT — FIBROSIS 4 INDEX: FIB4 SCORE: 0.97
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
## 2021-05-25 NOTE — CARE PLAN
Problem: Pain - Standard  Goal: Alleviation of pain or a reduction in pain to the patient’s comfort goal  Outcome: Progressing  Note: Pt educated to use 0-10 pain scale for PRN pain medications; administered as per MD orders.       Problem: Respiratory  Goal: Patient will achieve/maintain optimum respiratory ventilation and gas exchange  Outcome: Progressing  Note: Collaborating with RT and Interdisciplinary team on pt's treatment measures to improve respiratory function.     The patient is Watcher - Medium risk of patient condition declining or worsening    Shift Goals  Clinical Goals: Pain control, mobility  Patient Goals: Pulmonary hygiene, pain control    Progress made toward(s) clinical / shift goals:  Improvement on IS, mobility, increased intake.

## 2021-05-25 NOTE — PROGRESS NOTES
"Orthopaedic Progress Note    Author: Christian Odom P.A.-C. Date & Time created: 5/25/2021   1:27 PM     Interval Events:  Patient doing well  To OR Thursday for right hand   Can be done as outpatient if patient cleared to leave prior     Review of Systems   Constitutional: Negative.    Eyes: Negative.    Respiratory: Negative for shortness of breath.    Cardiovascular: Positive for chest pain (rib fx).   Musculoskeletal:        Pain well controlled DNVI   Neurological: Negative.      Hemodynamics:  /79   Pulse 74   Temp 37.1 °C (98.7 °F) (Temporal)   Resp 14   Ht 1.905 m (6' 3\")   Wt 87.8 kg (193 lb 8 oz)   SpO2 97%      No Active Precaution Orders    Respiratory:    Respiration: 14, Pulse Oximetry: 97 %     Work Of Breathing / Effort: Mild  RUL Breath Sounds: Diminished, RML Breath Sounds: Diminished, RLL Breath Sounds: Diminished, TOSHA Breath Sounds: Diminished, LLL Breath Sounds: Diminished  Fluids:    Intake/Output Summary (Last 24 hours) at 5/24/2021 1531  Last data filed at 5/24/2021 1200  Gross per 24 hour   Intake 3485 ml   Output 1440 ml   Net 2045 ml     Admit Weight: 90.7 kg (200 lb)  Current Weight: 87.8 kg (193 lb 8 oz)    Physical Exam   Constitutional: No distress.   HENT:   Head: Normocephalic.   Eyes: Conjunctivae are normal.   Cardiovascular: Normal rate and regular rhythm.   Abdominal: He exhibits no distension. There is no abdominal tenderness.   Musculoskeletal:      Comments: RUE in short arm splint CDI, DNVI, moves all fingers, cap refill <2 sec.    Neurological: He is alert.   Skin: He is not diaphoretic.     Labs:  Recent Labs     05/23/21  1255 05/24/21  0555 05/25/21  0550   WBC 19.0* 11.9* 9.1   RBC 4.90 4.32* 4.20*   HEMOGLOBIN 15.4 13.7* 13.3*   HEMATOCRIT 45.5 39.8* 39.0*   MCV 92.9 92.1 92.9   MCH 31.4 31.7 31.7   MCHC 33.8 34.4 34.1   RDW 42.5 42.7 42.5   PLATELETCT 301 218 183   MPV 9.8 10.2 10.4     Recent Labs     05/23/21  1255 05/24/21  0555 05/25/21  0550   SODIUM " 138 135 139   POTASSIUM 3.8 3.7 3.8   CHLORIDE 107 105 106   CO2 18* 22 23   GLUCOSE 124* 130* 114*   BUN 12 8 5*   CREATININE 0.89 0.84 0.72   CALCIUM 8.8 8.4* 8.5       Medical Decision Making/Problem List:    Active Hospital Problems    Diagnosis    • Pneumohemothorax, traumatic, initial encounter [S27.2XXA]    • Fracture of one rib, left side, initial encounter for closed fracture [S22.32XA]    • Fracture of manubrium, initial encounter for closed fracture [S22.21XA]    • Mult fractures of thoracic spine, closed, initial encounter ([facility]) [S22.009A]    • Multiple fractures of cervical spine, initial encounter ([facility]) [S12.9XXA]    • Injury of vertebral artery, left, initial encounter [S15.102A]    • Contraindication to deep vein thrombosis (DVT) prophylaxis [Z53.09]    • Screening examination for infectious disease [Z11.9]    • Closed fracture of fifth metacarpal bone of right hand, initial encounter [S62.306A]    • Acute urinary retention [R33.8]    • Discharge planning issues [Z02.9]    • Trauma [T14.90XA]      Core Measures & Quality Metrics:  Current DVT prophylaxis: per trauma  Discussed patient condition with RN, Patient and orthopedics.  Clearance for lovenox/heparin: per trauma  Weight Bearing Status: NWB RUE at hand  Wounds & Drains: splint left in place  Disposition and Follow-up: per therapy recs
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
## 2021-05-25 NOTE — THERAPY
Physical Therapy   Daily Treatment     Patient Name: Rafael Perla  Age:  35 y.o., Sex:  male  Medical Record #: [id number]  Today's Date: 5/25/2021     Precautions: Spinal / Back Precautions , , RUE NWB, Catheter    Assessment  Pt limited in today's session d/t nausea/vomiting and unable to ambulate as far as prior session. Attempted BM in BSC and with Nausea/vomiting then assisted to bedside chair. Assist needed for rolling and bed mobility but only EUGENE for transfers and gait. Maintains RUE NWB throughout. Slowly progressing towards therapy goals and would continue to benefit from skilled PT to progress towards PLOF. Will progress POC as tolerated.       Plan    Modify frequency and increase to 5x/week until goals met    DC Equipment Recommendations:  None  Discharge Recommendations:  Post acute placement to continue therapy, unless patient progresses to safe discharge level and able to perform all mobility/transfers/gait with SPV.     Subjective  Pt reports nausea and lightheadedness, but agreeable to PT session.      Objective  MODA Bed mobility, transfers and short distance gait CGA     05/25/21 0844   Total Time Spent   Total Time Spent (Mins) 34   Charge Group   Charges  Yes   PT Self Care / Home Evaluation  2   Precautions   Precautions Spinal / Back Precautions ;Cervical Collar     Comments  at all times, NWB RUE   Vitals   Vitals Comments WNL throughout session   Pain 0 - 10 Group   Therapist Pain Assessment Post Activity Pain Same as Prior to Activity;5  (ribs, neck)   Cognition    Cognition / Consciousness WDL   Level of Consciousness Alert   Comments Reports nausea, lightheaded   Balance   Sitting Balance (Static) Good   Sitting Balance (Dynamic) Fair +   Standing Balance (Static) Fair   Standing Balance (Dynamic) Fair -   Weight Shift Sitting Good   Weight Shift Standing Fair   Comments EUGENE in standing   Gait Analysis   Gait Level Of Assist   EUGENE   Assistive Device None   Distance (Feet) 10   # of  Times Distance was Traveled 1   Deviation Bradykinetic   # of Stairs Climbed 0   Bed Mobility    Supine to Sit Moderate Assist   Scooting Supervised   Rolling Moderate Assist to Rt.   Comments Cueing for log roll, sequencing   Functional Mobility   Sit to Stand   EUGENE   Bed, Chair, Wheelchair Transfer   EUGENE   Toilet Transfers   EUGENE   Transfer Method Stand Step   Mobility bed<>BSC<>Chair   Skilled Intervention Tactile Cuing;Verbal Cuing   How much difficulty does the patient currently have...   Turning over in bed (including adjusting bedclothes, sheets and blankets)? 1   Sitting down on and standing up from a chair with arms (e.g., wheelchair, bedside commode, etc.) 3   Moving from lying on back to sitting on the side of the bed? 1   How much help from another person does the patient currently need...   Moving to and from a bed to a chair (including a wheelchair)? 3   Need to walk in a hospital room? 3   Climbing 3-5 steps with a railing? 2   6 clicks Mobility Score 13   Activity Tolerance   Sitting in Chair Up in chair end of session   Patient / Family Goals    Patient / Family Goal #1 heal and go home   Goal #1 Outcome Goal not met   Short Term Goals    Short Term Goal # 1 Pt will demo bed mobility SPV in 6 visits to return home safely   Goal Outcome # 1 goal not met   Short Term Goal # 2 Pt will demo transfers with SPV in 6 visits to return home safely   Goal Outcome # 2 Goal not met   Short Term Goal # 3 Pt will ambulate 150 feet with no AD and SPV in 6 visits to return home safely    Goal Outcome # 3 Goal not met   Short Term Goal # 4 Pt will manage 2 steps with HHA/EUGENE (spouse providing assist) in 6 visits to return home safely    Goal Outcome # 4 Goal not met   Education Group   Education Provided Role of Physical Therapist   Spine Precautions Patient Response Patient;Acceptance;Explanation;Verbal Demonstration   Role of Physical Therapist Patient Response Patient;Acceptance;Explanation;Verbal  Demonstration   Anticipated Discharge Equipment and Recommendations   DC Equipment Recommendations None   Discharge Recommendations Anticipate that the patient will have no further physical therapy needs after discharge from the hospital   Interdisciplinary Plan of Care Collaboration   IDT Collaboration with  Nursing   Patient Position at End of Therapy Seated;Call Light within Reach;Tray Table within Reach;Phone within Reach   Collaboration Comments RN Updated   Session Information   Date / Session Number  5/25/21-2(2/3, 5/30)   Priority 4

## 2021-05-25 NOTE — PROGRESS NOTES
1200: Nikki MALHOTRA from [facility] returned call, updated on pt's current status, symptoms, and VS. Per NeuroSx team no new orders or imaging required at this time. Okay to transfer per Neuro.
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
## 2021-05-25 NOTE — CARE PLAN
Problem: Pain - Standard  Goal: Alleviation of pain or a reduction in pain to the patient’s comfort goal  Outcome: Progressing   Patient reports pain controlled  Problem: Hemodynamics  Goal: Patient's hemodynamics, fluid balance and neurologic status will be stable or improve  Outcome: Progressing    Patient remains hemodynamically

## 2021-05-26 ENCOUNTER — APPOINTMENT (OUTPATIENT)
Dept: RADIOLOGY | Facility: MEDICAL CENTER | Age: 36
DRG: 513 | End: 2021-05-26
Attending: SURGERY
Payer: COMMERCIAL

## 2021-05-26 LAB
ANION GAP SERPL CALC-SCNC: 10 MMOL/L (ref 7–16)
BASOPHILS # BLD AUTO: 0.3 % (ref 0–1.8)
BASOPHILS # BLD: 0.03 K/UL (ref 0–0.12)
BUN SERPL-MCNC: 7 MG/DL (ref 8–22)
CALCIUM SERPL-MCNC: 8.6 MG/DL (ref 8.5–10.5)
CHLORIDE SERPL-SCNC: 103 MMOL/L (ref 96–112)
CO2 SERPL-SCNC: 27 MMOL/L (ref 20–33)
CREAT SERPL-MCNC: 0.8 MG/DL (ref 0.5–1.4)
EOSINOPHIL # BLD AUTO: 0.11 K/UL (ref 0–0.51)
EOSINOPHIL NFR BLD: 1.2 % (ref 0–6.9)
ERYTHROCYTE [DISTWIDTH] IN BLOOD BY AUTOMATED COUNT: 42.5 FL (ref 35.9–50)
GLUCOSE SERPL-MCNC: 106 MG/DL (ref 65–99)
HCT VFR BLD AUTO: 39.7 % (ref 42–52)
HGB BLD-MCNC: 13.2 G/DL (ref 14–18)
IMM GRANULOCYTES # BLD AUTO: 0.04 K/UL (ref 0–0.11)
IMM GRANULOCYTES NFR BLD AUTO: 0.4 % (ref 0–0.9)
LYMPHOCYTES # BLD AUTO: 0.87 K/UL (ref 1–4.8)
LYMPHOCYTES NFR BLD: 9.7 % (ref 22–41)
MCH RBC QN AUTO: 31.2 PG (ref 27–33)
MCHC RBC AUTO-ENTMCNC: 33.2 G/DL (ref 33.7–35.3)
MCV RBC AUTO: 93.9 FL (ref 81.4–97.8)
MONOCYTES # BLD AUTO: 0.78 K/UL (ref 0–0.85)
MONOCYTES NFR BLD AUTO: 8.7 % (ref 0–13.4)
NEUTROPHILS # BLD AUTO: 7.18 K/UL (ref 1.82–7.42)
NEUTROPHILS NFR BLD: 79.7 % (ref 44–72)
NRBC # BLD AUTO: 0 K/UL
NRBC BLD-RTO: 0 /100 WBC
PLATELET # BLD AUTO: 202 K/UL (ref 164–446)
PMV BLD AUTO: 10.4 FL (ref 9–12.9)
POTASSIUM SERPL-SCNC: 3.8 MMOL/L (ref 3.6–5.5)
RBC # BLD AUTO: 4.23 M/UL (ref 4.7–6.1)
SODIUM SERPL-SCNC: 140 MMOL/L (ref 135–145)
WBC # BLD AUTO: 9 K/UL (ref 4.8–10.8)

## 2021-05-26 PROCEDURE — 700111 HCHG RX REV CODE 636 W/ 250 OVERRIDE (IP): Performed by: NURSE PRACTITIONER

## 2021-05-26 PROCEDURE — 99152 MOD SED SAME PHYS/QHP 5/>YRS: CPT

## 2021-05-26 PROCEDURE — 99233 SBSQ HOSP IP/OBS HIGH 50: CPT | Mod: 25 | Performed by: SURGERY

## 2021-05-26 PROCEDURE — 770022 HCHG ROOM/CARE - ICU (200)

## 2021-05-26 PROCEDURE — A9270 NON-COVERED ITEM OR SERVICE: HCPCS | Performed by: PHYSICAL MEDICINE & REHABILITATION

## 2021-05-26 PROCEDURE — 700102 HCHG RX REV CODE 250 W/ 637 OVERRIDE(OP): Performed by: PHYSICAL MEDICINE & REHABILITATION

## 2021-05-26 PROCEDURE — 0W9B30Z DRAINAGE OF LEFT PLEURAL CAVITY WITH DRAINAGE DEVICE, PERCUTANEOUS APPROACH: ICD-10-PCS | Performed by: SURGERY

## 2021-05-26 PROCEDURE — 700102 HCHG RX REV CODE 250 W/ 637 OVERRIDE(OP): Performed by: SURGERY

## 2021-05-26 PROCEDURE — 71045 X-RAY EXAM CHEST 1 VIEW: CPT

## 2021-05-26 PROCEDURE — 99232 SBSQ HOSP IP/OBS MODERATE 35: CPT | Performed by: PHYSICAL MEDICINE & REHABILITATION

## 2021-05-26 PROCEDURE — A9270 NON-COVERED ITEM OR SERVICE: HCPCS | Performed by: SURGERY

## 2021-05-26 PROCEDURE — 51798 US URINE CAPACITY MEASURE: CPT

## 2021-05-26 PROCEDURE — 80048 BASIC METABOLIC PNL TOTAL CA: CPT

## 2021-05-26 PROCEDURE — 700101 HCHG RX REV CODE 250: Performed by: SURGERY

## 2021-05-26 PROCEDURE — 32551 INSERTION OF CHEST TUBE: CPT

## 2021-05-26 PROCEDURE — C1729 CATH, DRAINAGE: HCPCS

## 2021-05-26 PROCEDURE — 700101 HCHG RX REV CODE 250: Performed by: NURSE PRACTITIONER

## 2021-05-26 PROCEDURE — 32551 INSERTION OF CHEST TUBE: CPT | Performed by: SURGERY

## 2021-05-26 PROCEDURE — 85025 COMPLETE CBC W/AUTO DIFF WBC: CPT

## 2021-05-26 PROCEDURE — 700111 HCHG RX REV CODE 636 W/ 250 OVERRIDE (IP): Performed by: SURGERY

## 2021-05-26 RX ORDER — OXYCODONE HYDROCHLORIDE 5 MG/1
5 TABLET ORAL
Status: DISCONTINUED | OUTPATIENT
Start: 2021-05-26 | End: 2021-05-30 | Stop reason: HOSPADM

## 2021-05-26 RX ORDER — LIDOCAINE 50 MG/G
2 PATCH TOPICAL EVERY 24 HOURS
Status: DISCONTINUED | OUTPATIENT
Start: 2021-05-27 | End: 2021-05-30 | Stop reason: HOSPADM

## 2021-05-26 RX ORDER — OXYCODONE HYDROCHLORIDE 10 MG/1
10 TABLET ORAL
Status: DISCONTINUED | OUTPATIENT
Start: 2021-05-26 | End: 2021-05-30 | Stop reason: HOSPADM

## 2021-05-26 RX ORDER — HYDROMORPHONE HYDROCHLORIDE 1 MG/ML
1 INJECTION, SOLUTION INTRAMUSCULAR; INTRAVENOUS; SUBCUTANEOUS
Status: DISCONTINUED | OUTPATIENT
Start: 2021-05-26 | End: 2021-05-27

## 2021-05-26 RX ORDER — PROPOFOL 10 MG/ML
0-200 INJECTION, EMULSION INTRAVENOUS ONCE
Status: COMPLETED | OUTPATIENT
Start: 2021-05-26 | End: 2021-05-26

## 2021-05-26 RX ORDER — LIDOCAINE 50 MG/G
1 PATCH TOPICAL ONCE
Status: COMPLETED | OUTPATIENT
Start: 2021-05-26 | End: 2021-05-27

## 2021-05-26 RX ADMIN — MORPHINE SULFATE 15 MG: 15 TABLET, FILM COATED, EXTENDED RELEASE ORAL at 17:25

## 2021-05-26 RX ADMIN — METHOCARBAMOL 500 MG: 500 TABLET ORAL at 20:27

## 2021-05-26 RX ADMIN — DOCUSATE SODIUM 100 MG: 100 CAPSULE, LIQUID FILLED ORAL at 04:30

## 2021-05-26 RX ADMIN — FAMOTIDINE 20 MG: 20 TABLET ORAL at 04:30

## 2021-05-26 RX ADMIN — ACETAMINOPHEN 1000 MG: 500 TABLET ORAL at 04:30

## 2021-05-26 RX ADMIN — OXYCODONE HYDROCHLORIDE 10 MG: 10 TABLET ORAL at 10:53

## 2021-05-26 RX ADMIN — BISACODYL 10 MG: 10 SUPPOSITORY RECTAL at 06:59

## 2021-05-26 RX ADMIN — HYDROMORPHONE HYDROCHLORIDE 1 MG: 1 INJECTION, SOLUTION INTRAMUSCULAR; INTRAVENOUS; SUBCUTANEOUS at 19:41

## 2021-05-26 RX ADMIN — OXYCODONE HYDROCHLORIDE 10 MG: 10 TABLET ORAL at 14:45

## 2021-05-26 RX ADMIN — HYDROMORPHONE HYDROCHLORIDE 0.5 MG: 1 INJECTION, SOLUTION INTRAMUSCULAR; INTRAVENOUS; SUBCUTANEOUS at 01:51

## 2021-05-26 RX ADMIN — MAGNESIUM HYDROXIDE 30 ML: 400 SUSPENSION ORAL at 04:30

## 2021-05-26 RX ADMIN — DOCUSATE SODIUM 100 MG: 100 CAPSULE, LIQUID FILLED ORAL at 17:25

## 2021-05-26 RX ADMIN — HYDROMORPHONE HYDROCHLORIDE 0.5 MG: 1 INJECTION, SOLUTION INTRAMUSCULAR; INTRAVENOUS; SUBCUTANEOUS at 07:48

## 2021-05-26 RX ADMIN — LIDOCAINE 1 PATCH: 50 PATCH TOPICAL at 10:53

## 2021-05-26 RX ADMIN — POLYETHYLENE GLYCOL 3350 1 PACKET: 17 POWDER, FOR SOLUTION ORAL at 04:30

## 2021-05-26 RX ADMIN — MORPHINE SULFATE 15 MG: 15 TABLET, FILM COATED, EXTENDED RELEASE ORAL at 04:30

## 2021-05-26 RX ADMIN — ENOXAPARIN SODIUM 30 MG: 30 INJECTION SUBCUTANEOUS at 17:25

## 2021-05-26 RX ADMIN — ACETAMINOPHEN 1000 MG: 500 TABLET ORAL at 23:42

## 2021-05-26 RX ADMIN — HYDROMORPHONE HYDROCHLORIDE 1 MG: 1 INJECTION, SOLUTION INTRAMUSCULAR; INTRAVENOUS; SUBCUTANEOUS at 11:49

## 2021-05-26 RX ADMIN — PROPOFOL 50 MG: 10 INJECTION, EMULSION INTRAVENOUS at 10:23

## 2021-05-26 RX ADMIN — OXYCODONE HYDROCHLORIDE 10 MG: 10 TABLET ORAL at 18:25

## 2021-05-26 RX ADMIN — METHOCARBAMOL 500 MG: 500 TABLET ORAL at 13:22

## 2021-05-26 RX ADMIN — HYDROMORPHONE HYDROCHLORIDE 0.5 MG: 1 INJECTION, SOLUTION INTRAMUSCULAR; INTRAVENOUS; SUBCUTANEOUS at 04:48

## 2021-05-26 RX ADMIN — LIDOCAINE 1 PATCH: 50 PATCH TOPICAL at 16:50

## 2021-05-26 RX ADMIN — PROPOFOL 20 MG: 10 INJECTION, EMULSION INTRAVENOUS at 10:26

## 2021-05-26 RX ADMIN — HYDROMORPHONE HYDROCHLORIDE 1 MG: 1 INJECTION, SOLUTION INTRAMUSCULAR; INTRAVENOUS; SUBCUTANEOUS at 13:22

## 2021-05-26 RX ADMIN — ASPIRIN 325 MG ORAL TABLET 325 MG: 325 PILL ORAL at 04:30

## 2021-05-26 RX ADMIN — METHOCARBAMOL 500 MG: 500 TABLET ORAL at 09:50

## 2021-05-26 RX ADMIN — PROPOFOL 30 MG: 10 INJECTION, EMULSION INTRAVENOUS at 10:21

## 2021-05-26 RX ADMIN — METHOCARBAMOL 500 MG: 500 TABLET ORAL at 17:25

## 2021-05-26 RX ADMIN — DOCUSATE SODIUM 50 MG AND SENNOSIDES 8.6 MG 1 TABLET: 8.6; 5 TABLET, FILM COATED ORAL at 20:27

## 2021-05-26 RX ADMIN — ACETAMINOPHEN 1000 MG: 500 TABLET ORAL at 11:50

## 2021-05-26 RX ADMIN — ACETAMINOPHEN 1000 MG: 500 TABLET ORAL at 17:25

## 2021-05-26 RX ADMIN — FAMOTIDINE 20 MG: 20 TABLET ORAL at 17:25

## 2021-05-26 RX ADMIN — HYDROMORPHONE HYDROCHLORIDE 1 MG: 1 INJECTION, SOLUTION INTRAMUSCULAR; INTRAVENOUS; SUBCUTANEOUS at 16:50

## 2021-05-26 ASSESSMENT — PAIN DESCRIPTION - PAIN TYPE
TYPE: ACUTE PAIN

## 2021-05-26 ASSESSMENT — ENCOUNTER SYMPTOMS
ARTHRALGIAS: 1
BACK PAIN: 1
MYALGIAS: 1

## 2021-05-26 ASSESSMENT — PATIENT HEALTH QUESTIONNAIRE - PHQ9
1. LITTLE INTEREST OR PLEASURE IN DOING THINGS: NOT AT ALL
2. FEELING DOWN, DEPRESSED, IRRITABLE, OR HOPELESS: NOT AT ALL
SUM OF ALL RESPONSES TO PHQ9 QUESTIONS 1 AND 2: 0

## 2021-05-26 ASSESSMENT — FIBROSIS 4 INDEX: FIB4 SCORE: 1.23
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
## 2021-05-26 NOTE — CARE PLAN
Problem: Knowledge Deficit - Standard  Goal: Patient and family/care givers will demonstrate understanding of plan of care, disease process/condition, diagnostic tests and medications  Outcome: Progressing  Note: Pt updated on plan of care, educated about diet, oxygen, mobility, brace, and injuries  Problem: Pain - Standard  Goal: Alleviation of pain or a reduction in pain to the patient’s comfort goal  Outcome: Progressing  Note: Pt educated about PRN pain medication and other methods of managing pain   The patient is Watcher - Medium risk of patient condition declining or worsening

## 2021-05-26 NOTE — DISCHARGE PLANNING
Anticipated Discharge Disposition: Home with support from family or with  services    Action: Chart reviewed and assessment completed. Patient lives with spouse in a single level house. Spouse is dc support. Prior to current hospitalization, patient was completely independent with ADLs/IADLs. No prior DME. PCP is [facility]igley and pharmacy is [facility]. Patient has RX coverage through his [employer] insurance plan.     PMR consult placed. Anticipating that patient will be able to dc home with family support. OT recommending HH and PT now recommending post acute placement however they're hopeful pt may progress to HH level.     Barriers to Discharge: Medical clearance and unknown dc plan as there are no post acute referrals     Plan: Continue to follow and assist if dc needs arise     Care Transition Team Assessment    Information Source  Orientation Level: Oriented X4  Information Given By: Patient  Who is responsible for making decisions for patient? : Patient    Readmission Evaluation  Is this a readmission?: No    Elopement Risk  Legal Hold: No  Ambulatory or Self Mobile in Wheelchair: No-Not an Elopement Risk  Elopement Risk: Not at Risk for Elopement    Interdisciplinary Discharge Planning  Lives with - Patient's Self Care Capacity: Spouse  Patient or legal guardian wants to designate a caregiver: No  Support Systems: Family Member(s)  Housing / Facility: 1 Story House  Prior Services: Home-Independent  Durable Medical Equipment: Not Applicable    Discharge Preparedness  What is your plan after discharge?: Home with help, Home health care  What are your discharge supports?: Parent, Spouse  Prior Functional Level: Ambulatory, Drives Self, Independent with Activities of Daily Living, Independent with Medication Management    Functional Assesment  Prior Functional Level: Ambulatory, Drives Self, Independent with Activities of Daily Living, Independent with Medication Management    Finances  Financial Barriers to  Discharge: No  Prescription Coverage: Yes    Vision / Hearing Impairment  Vision Impairment : No  Hearing Impairment : No    Advance Directive  Advance Directive?: None    Domestic Abuse  Have you ever been the victim of abuse or violence?: No  Physical Abuse or Sexual Abuse: No  Verbal Abuse or Emotional Abuse: No  Possible Abuse/Neglect Reported to:: Not Applicable    Psychological Assessment  History of Substance Abuse: None  History of Psychiatric Problems: No  Non-compliant with Treatment: No  Newly Diagnosed Illness: Yes    Discharge Risks or Barriers  Discharge risks or barriers?: Complex medical needs    Anticipated Discharge Information  Discharge Disposition: Discharged to home/self care   - May need HH
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
## 2021-05-26 NOTE — CARE PLAN
The patient is Stable - Low risk of patient condition declining or worsening    Shift Goals  Clinical Goals: Pain control, mobility, rest, bowel movement  Patient Goals: rest, pain control, bowel movement      Problem: Pain - Standard  Goal: Alleviation of pain or a reduction in pain to the patient’s comfort goal  Outcome: Progressing     Problem: Skin Integrity  Goal: Skin integrity is maintained or improved  Outcome: Progressing     Problem: Psychosocial  Goal: Patient's level of anxiety will decrease  Outcome: Progressing     Problem: Hemodynamics  Goal: Patient's hemodynamics, fluid balance and neurologic status will be stable or improve  Outcome: Progressing     Problem: Urinary Elimination  Goal: Establish and maintain regular urinary output  Outcome: Progressing     Problem: Respiratory  Goal: Patient will achieve/maintain optimum respiratory ventilation and gas exchange  Outcome: Progressing
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
## 2021-05-27 ENCOUNTER — APPOINTMENT (OUTPATIENT)
Dept: RADIOLOGY | Facility: MEDICAL CENTER | Age: 36
DRG: 513 | End: 2021-05-27
Attending: ORTHOPAEDIC SURGERY
Payer: COMMERCIAL

## 2021-05-27 ENCOUNTER — ANESTHESIA EVENT (OUTPATIENT)
Dept: SURGERY | Facility: MEDICAL CENTER | Age: 36
DRG: 513 | End: 2021-05-27
Payer: COMMERCIAL

## 2021-05-27 ENCOUNTER — APPOINTMENT (OUTPATIENT)
Dept: RADIOLOGY | Facility: MEDICAL CENTER | Age: 36
DRG: 513 | End: 2021-05-27
Attending: SURGERY
Payer: COMMERCIAL

## 2021-05-27 ENCOUNTER — ANESTHESIA (OUTPATIENT)
Dept: SURGERY | Facility: MEDICAL CENTER | Age: 36
DRG: 513 | End: 2021-05-27
Payer: COMMERCIAL

## 2021-05-27 PROCEDURE — 160048 HCHG OR STATISTICAL LEVEL 1-5: Performed by: ORTHOPAEDIC SURGERY

## 2021-05-27 PROCEDURE — 160035 HCHG PACU - 1ST 60 MINS PHASE I: Performed by: ORTHOPAEDIC SURGERY

## 2021-05-27 PROCEDURE — 700102 HCHG RX REV CODE 250 W/ 637 OVERRIDE(OP): Performed by: SURGERY

## 2021-05-27 PROCEDURE — 160002 HCHG RECOVERY MINUTES (STAT): Performed by: ORTHOPAEDIC SURGERY

## 2021-05-27 PROCEDURE — 700101 HCHG RX REV CODE 250: Performed by: ORTHOPAEDIC SURGERY

## 2021-05-27 PROCEDURE — L0172 CERV COL SR FOAM 2PC PRE OTS: HCPCS

## 2021-05-27 PROCEDURE — 700111 HCHG RX REV CODE 636 W/ 250 OVERRIDE (IP): Performed by: SURGERY

## 2021-05-27 PROCEDURE — 770001 HCHG ROOM/CARE - MED/SURG/GYN PRIV*

## 2021-05-27 PROCEDURE — 99233 SBSQ HOSP IP/OBS HIGH 50: CPT | Performed by: PHYSICAL MEDICINE & REHABILITATION

## 2021-05-27 PROCEDURE — C1713 ANCHOR/SCREW BN/BN,TIS/BN: HCPCS | Performed by: ORTHOPAEDIC SURGERY

## 2021-05-27 PROCEDURE — A9270 NON-COVERED ITEM OR SERVICE: HCPCS | Performed by: SURGERY

## 2021-05-27 PROCEDURE — 700102 HCHG RX REV CODE 250 W/ 637 OVERRIDE(OP): Performed by: PHYSICAL MEDICINE & REHABILITATION

## 2021-05-27 PROCEDURE — 700101 HCHG RX REV CODE 250: Performed by: SURGERY

## 2021-05-27 PROCEDURE — 306637 HCHG MISC ORTHO ITEM RC 0274

## 2021-05-27 PROCEDURE — 73130 X-RAY EXAM OF HAND: CPT | Mod: RT

## 2021-05-27 PROCEDURE — 97530 THERAPEUTIC ACTIVITIES: CPT

## 2021-05-27 PROCEDURE — 51798 US URINE CAPACITY MEASURE: CPT

## 2021-05-27 PROCEDURE — 97535 SELF CARE MNGMENT TRAINING: CPT

## 2021-05-27 PROCEDURE — 0PHP34Z INSERTION OF INTERNAL FIXATION DEVICE INTO RIGHT METACARPAL, PERCUTANEOUS APPROACH: ICD-10-PCS | Performed by: ORTHOPAEDIC SURGERY

## 2021-05-27 PROCEDURE — 700111 HCHG RX REV CODE 636 W/ 250 OVERRIDE (IP): Performed by: ANESTHESIOLOGY

## 2021-05-27 PROCEDURE — 700105 HCHG RX REV CODE 258: Performed by: ANESTHESIOLOGY

## 2021-05-27 PROCEDURE — A9270 NON-COVERED ITEM OR SERVICE: HCPCS | Performed by: PHYSICAL MEDICINE & REHABILITATION

## 2021-05-27 PROCEDURE — 160009 HCHG ANES TIME/MIN: Performed by: ORTHOPAEDIC SURGERY

## 2021-05-27 PROCEDURE — 160028 HCHG SURGERY MINUTES - 1ST 30 MINS LEVEL 3: Performed by: ORTHOPAEDIC SURGERY

## 2021-05-27 PROCEDURE — 700101 HCHG RX REV CODE 250: Performed by: ANESTHESIOLOGY

## 2021-05-27 PROCEDURE — 99232 SBSQ HOSP IP/OBS MODERATE 35: CPT | Performed by: SURGERY

## 2021-05-27 PROCEDURE — 501838 HCHG SUTURE GENERAL: Performed by: ORTHOPAEDIC SURGERY

## 2021-05-27 PROCEDURE — 502000 HCHG MISC OR IMPLANTS RC 0278: Performed by: ORTHOPAEDIC SURGERY

## 2021-05-27 PROCEDURE — 160039 HCHG SURGERY MINUTES - EA ADDL 1 MIN LEVEL 3: Performed by: ORTHOPAEDIC SURGERY

## 2021-05-27 PROCEDURE — 700111 HCHG RX REV CODE 636 W/ 250 OVERRIDE (IP): Performed by: NURSE PRACTITIONER

## 2021-05-27 PROCEDURE — 71045 X-RAY EXAM CHEST 1 VIEW: CPT

## 2021-05-27 PROCEDURE — 502240 HCHG MISC OR SUPPLY RC 0272: Performed by: ORTHOPAEDIC SURGERY

## 2021-05-27 RX ORDER — HYDROMORPHONE HYDROCHLORIDE 1 MG/ML
0.1 INJECTION, SOLUTION INTRAMUSCULAR; INTRAVENOUS; SUBCUTANEOUS
Status: DISCONTINUED | OUTPATIENT
Start: 2021-05-27 | End: 2021-05-27 | Stop reason: HOSPADM

## 2021-05-27 RX ORDER — OXYCODONE HCL 5 MG/5 ML
10 SOLUTION, ORAL ORAL
Status: DISCONTINUED | OUTPATIENT
Start: 2021-05-27 | End: 2021-05-27 | Stop reason: HOSPADM

## 2021-05-27 RX ORDER — BUPIVACAINE HYDROCHLORIDE 5 MG/ML
INJECTION, SOLUTION EPIDURAL; INTRACAUDAL
Status: DISCONTINUED | OUTPATIENT
Start: 2021-05-27 | End: 2021-05-27 | Stop reason: HOSPADM

## 2021-05-27 RX ORDER — CEFAZOLIN SODIUM 1 G/3ML
INJECTION, POWDER, FOR SOLUTION INTRAMUSCULAR; INTRAVENOUS PRN
Status: DISCONTINUED | OUTPATIENT
Start: 2021-05-27 | End: 2021-05-27 | Stop reason: SURG

## 2021-05-27 RX ORDER — HALOPERIDOL 5 MG/ML
1 INJECTION INTRAMUSCULAR
Status: DISCONTINUED | OUTPATIENT
Start: 2021-05-27 | End: 2021-05-27 | Stop reason: HOSPADM

## 2021-05-27 RX ORDER — HYDROMORPHONE HYDROCHLORIDE 1 MG/ML
0.2 INJECTION, SOLUTION INTRAMUSCULAR; INTRAVENOUS; SUBCUTANEOUS
Status: DISCONTINUED | OUTPATIENT
Start: 2021-05-27 | End: 2021-05-27 | Stop reason: HOSPADM

## 2021-05-27 RX ORDER — MIDAZOLAM HYDROCHLORIDE 1 MG/ML
INJECTION INTRAMUSCULAR; INTRAVENOUS PRN
Status: DISCONTINUED | OUTPATIENT
Start: 2021-05-27 | End: 2021-05-27 | Stop reason: SURG

## 2021-05-27 RX ORDER — LIDOCAINE HYDROCHLORIDE 20 MG/ML
INJECTION, SOLUTION EPIDURAL; INFILTRATION; INTRACAUDAL; PERINEURAL PRN
Status: DISCONTINUED | OUTPATIENT
Start: 2021-05-27 | End: 2021-05-27 | Stop reason: SURG

## 2021-05-27 RX ORDER — HYDROMORPHONE HYDROCHLORIDE 1 MG/ML
.5-1 INJECTION, SOLUTION INTRAMUSCULAR; INTRAVENOUS; SUBCUTANEOUS
Status: DISCONTINUED | OUTPATIENT
Start: 2021-05-27 | End: 2021-05-30 | Stop reason: HOSPADM

## 2021-05-27 RX ORDER — HYDROMORPHONE HYDROCHLORIDE 1 MG/ML
0.4 INJECTION, SOLUTION INTRAMUSCULAR; INTRAVENOUS; SUBCUTANEOUS
Status: DISCONTINUED | OUTPATIENT
Start: 2021-05-27 | End: 2021-05-27 | Stop reason: HOSPADM

## 2021-05-27 RX ORDER — MEPERIDINE HYDROCHLORIDE 25 MG/ML
6.25 INJECTION INTRAMUSCULAR; INTRAVENOUS; SUBCUTANEOUS
Status: DISCONTINUED | OUTPATIENT
Start: 2021-05-27 | End: 2021-05-27 | Stop reason: HOSPADM

## 2021-05-27 RX ORDER — DEXAMETHASONE SODIUM PHOSPHATE 4 MG/ML
INJECTION, SOLUTION INTRA-ARTICULAR; INTRALESIONAL; INTRAMUSCULAR; INTRAVENOUS; SOFT TISSUE PRN
Status: DISCONTINUED | OUTPATIENT
Start: 2021-05-27 | End: 2021-05-27 | Stop reason: SURG

## 2021-05-27 RX ORDER — ONDANSETRON 2 MG/ML
INJECTION INTRAMUSCULAR; INTRAVENOUS PRN
Status: DISCONTINUED | OUTPATIENT
Start: 2021-05-27 | End: 2021-05-27 | Stop reason: SURG

## 2021-05-27 RX ORDER — ONDANSETRON 2 MG/ML
4 INJECTION INTRAMUSCULAR; INTRAVENOUS
Status: DISCONTINUED | OUTPATIENT
Start: 2021-05-27 | End: 2021-05-27 | Stop reason: HOSPADM

## 2021-05-27 RX ORDER — SODIUM CHLORIDE, SODIUM LACTATE, POTASSIUM CHLORIDE, CALCIUM CHLORIDE 600; 310; 30; 20 MG/100ML; MG/100ML; MG/100ML; MG/100ML
INJECTION, SOLUTION INTRAVENOUS CONTINUOUS
Status: DISCONTINUED | OUTPATIENT
Start: 2021-05-27 | End: 2021-05-27 | Stop reason: HOSPADM

## 2021-05-27 RX ORDER — OXYCODONE HCL 5 MG/5 ML
5 SOLUTION, ORAL ORAL
Status: DISCONTINUED | OUTPATIENT
Start: 2021-05-27 | End: 2021-05-27 | Stop reason: HOSPADM

## 2021-05-27 RX ORDER — SODIUM CHLORIDE, SODIUM LACTATE, POTASSIUM CHLORIDE, CALCIUM CHLORIDE 600; 310; 30; 20 MG/100ML; MG/100ML; MG/100ML; MG/100ML
INJECTION, SOLUTION INTRAVENOUS
Status: DISCONTINUED | OUTPATIENT
Start: 2021-05-27 | End: 2021-05-27 | Stop reason: SURG

## 2021-05-27 RX ADMIN — MORPHINE SULFATE 15 MG: 15 TABLET, FILM COATED, EXTENDED RELEASE ORAL at 16:57

## 2021-05-27 RX ADMIN — SODIUM CHLORIDE, POTASSIUM CHLORIDE, SODIUM LACTATE AND CALCIUM CHLORIDE: 600; 310; 30; 20 INJECTION, SOLUTION INTRAVENOUS at 14:17

## 2021-05-27 RX ADMIN — ENOXAPARIN SODIUM 30 MG: 30 INJECTION SUBCUTANEOUS at 16:57

## 2021-05-27 RX ADMIN — MORPHINE SULFATE 15 MG: 15 TABLET, FILM COATED, EXTENDED RELEASE ORAL at 05:07

## 2021-05-27 RX ADMIN — HYDROMORPHONE HYDROCHLORIDE 1 MG: 1 INJECTION, SOLUTION INTRAMUSCULAR; INTRAVENOUS; SUBCUTANEOUS at 06:54

## 2021-05-27 RX ADMIN — METHOCARBAMOL 500 MG: 500 TABLET ORAL at 13:32

## 2021-05-27 RX ADMIN — HYDROMORPHONE HYDROCHLORIDE 1 MG: 1 INJECTION, SOLUTION INTRAMUSCULAR; INTRAVENOUS; SUBCUTANEOUS at 16:36

## 2021-05-27 RX ADMIN — CEFAZOLIN 2 G: 330 INJECTION, POWDER, FOR SOLUTION INTRAMUSCULAR; INTRAVENOUS at 14:32

## 2021-05-27 RX ADMIN — HYDROMORPHONE HYDROCHLORIDE 1 MG: 1 INJECTION, SOLUTION INTRAMUSCULAR; INTRAVENOUS; SUBCUTANEOUS at 23:57

## 2021-05-27 RX ADMIN — METHOCARBAMOL 500 MG: 500 TABLET ORAL at 08:26

## 2021-05-27 RX ADMIN — ENOXAPARIN SODIUM 30 MG: 30 INJECTION SUBCUTANEOUS at 08:26

## 2021-05-27 RX ADMIN — METHOCARBAMOL 500 MG: 500 TABLET ORAL at 20:04

## 2021-05-27 RX ADMIN — ACETAMINOPHEN 1000 MG: 500 TABLET ORAL at 05:07

## 2021-05-27 RX ADMIN — PROPOFOL 20 MG: 10 INJECTION, EMULSION INTRAVENOUS at 15:09

## 2021-05-27 RX ADMIN — FAMOTIDINE 20 MG: 10 INJECTION INTRAVENOUS at 05:07

## 2021-05-27 RX ADMIN — LIDOCAINE HYDROCHLORIDE 100 MG: 20 INJECTION, SOLUTION EPIDURAL; INFILTRATION; INTRACAUDAL at 14:28

## 2021-05-27 RX ADMIN — Medication 100 MG: at 14:28

## 2021-05-27 RX ADMIN — DEXAMETHASONE SODIUM PHOSPHATE 8 MG: 4 INJECTION, SOLUTION INTRA-ARTICULAR; INTRALESIONAL; INTRAMUSCULAR; INTRAVENOUS; SOFT TISSUE at 14:28

## 2021-05-27 RX ADMIN — ACETAMINOPHEN 1000 MG: 500 TABLET ORAL at 11:23

## 2021-05-27 RX ADMIN — ASPIRIN 325 MG ORAL TABLET 325 MG: 325 PILL ORAL at 08:26

## 2021-05-27 RX ADMIN — ONDANSETRON 4 MG: 2 INJECTION INTRAMUSCULAR; INTRAVENOUS at 06:53

## 2021-05-27 RX ADMIN — HYDROMORPHONE HYDROCHLORIDE 1 MG: 1 INJECTION, SOLUTION INTRAMUSCULAR; INTRAVENOUS; SUBCUTANEOUS at 20:10

## 2021-05-27 RX ADMIN — MIDAZOLAM HYDROCHLORIDE 2 MG: 1 INJECTION, SOLUTION INTRAMUSCULAR; INTRAVENOUS at 14:18

## 2021-05-27 RX ADMIN — LIDOCAINE 2 PATCH: 50 PATCH TOPICAL at 19:01

## 2021-05-27 RX ADMIN — ACETAMINOPHEN 1000 MG: 500 TABLET ORAL at 16:57

## 2021-05-27 RX ADMIN — DOCUSATE SODIUM 50 MG AND SENNOSIDES 8.6 MG 1 TABLET: 8.6; 5 TABLET, FILM COATED ORAL at 20:05

## 2021-05-27 RX ADMIN — ONDANSETRON 4 MG: 2 INJECTION INTRAMUSCULAR; INTRAVENOUS at 15:03

## 2021-05-27 RX ADMIN — ACETAMINOPHEN 1000 MG: 500 TABLET ORAL at 23:57

## 2021-05-27 RX ADMIN — LIDOCAINE HYDROCHLORIDE 100 MG: 20 INJECTION, SOLUTION EPIDURAL; INFILTRATION; INTRACAUDAL at 15:09

## 2021-05-27 RX ADMIN — HYDROMORPHONE HYDROCHLORIDE 1 MG: 1 INJECTION, SOLUTION INTRAMUSCULAR; INTRAVENOUS; SUBCUTANEOUS at 02:51

## 2021-05-27 RX ADMIN — OXYCODONE 5 MG: 5 TABLET ORAL at 11:23

## 2021-05-27 RX ADMIN — HYDROMORPHONE HYDROCHLORIDE 1 MG: 1 INJECTION, SOLUTION INTRAMUSCULAR; INTRAVENOUS; SUBCUTANEOUS at 00:43

## 2021-05-27 RX ADMIN — DOCUSATE SODIUM 100 MG: 100 CAPSULE, LIQUID FILLED ORAL at 16:57

## 2021-05-27 RX ADMIN — FENTANYL CITRATE 50 MCG: 50 INJECTION, SOLUTION INTRAMUSCULAR; INTRAVENOUS at 14:25

## 2021-05-27 RX ADMIN — PROPOFOL 180 MG: 10 INJECTION, EMULSION INTRAVENOUS at 14:28

## 2021-05-27 RX ADMIN — FAMOTIDINE 20 MG: 20 TABLET ORAL at 16:57

## 2021-05-27 RX ADMIN — METHOCARBAMOL 500 MG: 500 TABLET ORAL at 16:57

## 2021-05-27 ASSESSMENT — PAIN DESCRIPTION - PAIN TYPE
TYPE: ACUTE PAIN
TYPE: SURGICAL PAIN
TYPE: ACUTE PAIN
TYPE: ACUTE PAIN
TYPE: SURGICAL PAIN
TYPE: SURGICAL PAIN
TYPE: ACUTE PAIN

## 2021-05-27 ASSESSMENT — COGNITIVE AND FUNCTIONAL STATUS - GENERAL
DAILY ACTIVITIY SCORE: 14
TOILETING: A LOT
HELP NEEDED FOR BATHING: A LOT
DRESSING REGULAR LOWER BODY CLOTHING: A LOT
DRESSING REGULAR UPPER BODY CLOTHING: A LOT
SUGGESTED CMS G CODE MODIFIER DAILY ACTIVITY: CK
PERSONAL GROOMING: A LITTLE
EATING MEALS: A LITTLE

## 2021-05-27 ASSESSMENT — ENCOUNTER SYMPTOMS
BACK PAIN: 1
ARTHRALGIAS: 1
MYALGIAS: 1

## 2021-05-27 ASSESSMENT — GAIT ASSESSMENTS: DISTANCE (FEET): 2

## 2021-05-27 ASSESSMENT — FIBROSIS 4 INDEX: FIB4 SCORE: 1.12

## 2021-05-27 ASSESSMENT — PAIN SCALES - GENERAL: PAIN_LEVEL: 0

## 2021-05-27 NOTE — CARE PLAN
The patient is Stable - Low risk of patient condition declining or worsening    Shift Goals  Clinical Goals: pain control  Patient Goals: Rest and pain control     Progress made toward(s) clinical / shift goals: Pt given PRN pain medications. Pt resting comfortably after pain medication.   Problem: Pain - Standard  Goal: Alleviation of pain or a reduction in pain to the patient’s comfort goal  Outcome: Progressing     Problem: Urinary Elimination  Goal: Establish and maintain regular urinary output  Outcome: Progressing       Patient is not progressing towards the following goals:

## 2021-05-27 NOTE — CARE PLAN
The patient is Stable - Low risk of patient condition declining or worsening    Shift Goals  Clinical Goals: Work with IS, mobility, pain control  Patient Goals: Work with IS, mobility, shower    Progress made toward(s) clinical / shift goals:  Work with IS, mobility, pain control      Problem: Knowledge Deficit - Standard  Goal: Patient and family/care givers will demonstrate understanding of plan of care, disease process/condition, diagnostic tests and medications  Outcome: Progressing     Problem: Respiratory  Goal: Patient will achieve/maintain optimum respiratory ventilation and gas exchange  Outcome: Progressing
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
## 2021-05-27 NOTE — PROGRESS NOTES
Case d/w Dr. Hernandez  Pt appears to be compensating well, OOB in chair, A+O x 4 despite CT placement yest  To OR for hand ORIF today

## 2021-05-27 NOTE — ANESTHESIA PROCEDURE NOTES
Peripheral IV    Date/Time: 5/27/2021 2:40 PM  Performed by: Veena Garcia M.D.  Authorized by: Veena Garcia M.D.     Size:  16 G  Laterality:  Left  Local Anesthetic:  None  Site Prep:  Alcohol  Technique:  Direct puncture  Attempts:  1
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
## 2021-05-27 NOTE — ANESTHESIA PROCEDURE NOTES
Airway    Date/Time: 5/27/2021 2:29 PM  Performed by: Veena Garcia M.D.  Authorized by: Veena Garcia M.D.     Location:  OR  Urgency:  Elective  Difficult Airway: No    Indications for Airway Management:  Anesthesia      Spontaneous Ventilation: absent    Sedation Level:  Deep  Preoxygenated: Yes    Patient Position:  Reverse Trendelenburg (C-collar in place)  Mask Difficulty Assessment:  0 - not attempted  Final Airway Type:  Endotracheal airway  Final Endotracheal Airway:  ETT  Cuffed: Yes    Technique Used for Successful ETT Placement:  Video laryngoscopy  Devices/Methods Used in Placement:  Intubating stylet    Insertion Site:  Oral  Blade Type:  Glide  Laryngoscope Blade/Videolaryngoscope Blade Size:  4  ETT Size (mm):  7.5  Measured from:  Teeth  ETT to Teeth (cm):  23  Placement Verified by: auscultation and capnometry    Cormack-Lehane Classification:  Grade I - full view of glottis  Number of Attempts at Approach:  1

## 2021-05-27 NOTE — DISCHARGE PLANNING
Hand surgery today.  Would appreciate updated PT/OT evals s/p surgery.  IV Dil x7 in the past 24h as well as a chest tube are current barriers.  TCC remains monitoring.
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
## 2021-05-27 NOTE — PROGRESS NOTES
Submitted custom order for [facility].  To check the status of the order please call [phone].

## 2021-05-27 NOTE — THERAPY
"Occupational Therapy  Daily Treatment     Patient Name: Rafael Perla  Age:  35 y.o., Sex:  male  Medical Record #: [id number]  Today's Date: 5/27/2021    Precautions: Fall Risk, Chest Tube, Non Weight Bearing Right Lower Extremity, Spinal / Back Precautions, Cervical Collar  , Other:  at all times     Assessment    Pt seen for OT tx. Had CT placed since last session; still awaiting repair of hand fxs. Pt demonstrated good log roll, but required cues to not pull on bed rail with R hand. Pt completed: sponge bath, shampoo cap hair wash, oral care, UB/LB dressing. Pt demos improved functional mobility and ADL this session. Progressing well towards OT goals (new ADL goals added). Spouse not present for training on . RN requested Reshma walters for showering. Will continue to benefit from acute OT.     Plan    Continue current treatment plan.    DC Equipment Recommendations: Unable to determine at this time  Discharge Recommendations: Recommend home health for continued occupational therapy services    Subjective    \"I hope to have my hand surgery today\"     Objective       05/27/21 0934   Active ROM Upper Body   Lt Shoulder Flexion Degrees 100   Comments L shoulder limited by L rib cage pain; R hand splinted to wrist, 4th, 5th digits   Strength Upper Body   Comments L shoulder NT due to rib pain; R hand NT due to digit fx, splint   Balance   Sitting Balance (Static) Good   Sitting Balance (Dynamic) Fair +   Standing Balance (Static) Fair   Standing Balance (Dynamic) Fair   Weight Shift Sitting Good   Weight Shift Standing Fair   Comments standing without AD   Bed Mobility    Supine to Sit Minimal Assist  (cues not to pull rail using R hand )   Sit to Supine   (NT, up to chair post)   Scooting Supervised  (seated)   Rolling Minimal Assist to Rt.   Comments log roll to EOB   Activities of Daily Living   Grooming Minimal Assist;Seated  (oral care )   Bathing Moderate Assist  (sponge bath and shower cap )   Upper Body " Dressing Maximal Assist  (gown change)   Lower Body Dressing Maximal Assist  (don/doff B socks )   Toileting   (NT; declined need)   Functional Mobility   Sit to Stand Supervised   Bed, Chair, Wheelchair Transfer Supervised   Transfer Method Stand Step  (no AD)   Mobility Supine > EOB, few steps and pivot to chair    Short Term Goals   Short Term Goal # 1 Pt will dress UE with Ena   Goal Outcome # 1 Progressing as expected   Short Term Goal # 2 Pt will direct his wife to don/doff  correctly   Goal Outcome # 2   (NT; spouse not present )   Short Term Goal # 3 Pt/wife will demo how to change out pads on    Goal Outcome # 3   (NT; spouse not present )   Short Term Goal # 4 Pt will complete UB dressing with min A  (5/27 new goal )   Goal Outcome # 4 Progressing as expected   Short Term Goal # 5 Pt will complete LB dressing with supv   (5/27 new goal )   Goal Outcome # 5 Progressing as expected

## 2021-05-27 NOTE — ANESTHESIA POSTPROCEDURE EVALUATION
Patient: Rafael Perla    Procedure Summary     Date: 05/27/21 Room / Location: [place] [address] / SURGERY [facility]    Anesthesia Start: 1417 Anesthesia Stop: 1530    Procedure: PINNING, FRACTURE, PERCUTANEOUS RIGHT 5TH METACARPAL (Right Hand) Diagnosis:     Surgeons: Guzman Hernandez M.D. Responsible Provider: Veena Garcia M.D.    Anesthesia Type: general ASA Status: 2          Final Anesthesia Type: general  Last vitals  BP   172/91   Temp   97.9   Pulse   83   Resp   16   SpO2   98%     Anesthesia Post Evaluation    Patient location during evaluation: PACU  Patient participation: complete - patient participated  Level of consciousness: awake and alert  Pain score: 0    Airway patency: patent  Anesthetic complications: no  Cardiovascular status: hemodynamically stable  Respiratory status: acceptable  Hydration status: euvolemic    PONV: none          No complications documented.

## 2021-05-27 NOTE — THERAPY
05/27/21 1320   Interdisciplinary Plan of Care Collaboration   Collaboration Comments Defer today, up w/ nsg and going for sx today (hand)

## 2021-05-27 NOTE — PROGRESS NOTES
"Progress Note     Interval changes:improved     ROS - Patient denies any new issues. No chest pain, dyspnea, or fever.  Pain well controlled.   /96   Pulse 79   Temp 36.8 °C (98.2 °F) (Temporal)   Resp 14   Ht 1.905 m (6' 3\")   Wt 88.6 kg (195 lb 5.2 oz)   SpO2 97%   BMI 24.41 kg/m²      Patient seen and examined  No acute distress  Breathing non labored  RRR  Right hand: moderate bruising, crepitus over fifth metacarpal shaft  Fingers wwp   FDS/FDP intact    Recent Labs     05/25/21  0550 05/26/21  0440   WBC 9.1 9.0   RBC 4.20* 4.23*   HEMOGLOBIN 13.3* 13.2*   HEMATOCRIT 39.0* 39.7*   MCV 92.9 93.9   MCH 31.7 31.2   RDW 42.5 42.5   PLATELETCT 183 202   MPV 10.4 10.4   NEUTSPOLYS 72.90* 79.70*   LYMPHOCYTES 15.30* 9.70*   MONOCYTES 10.00 8.70   EOSINOPHILS 1.20 1.20   BASOPHILS 0.30 0.30     Recent Labs     05/25/21  0550 05/26/21  0440   SODIUM 139 140   POTASSIUM 3.8 3.8   CHLORIDE 106 103   CO2 23 27   GLUCOSE 114* 106*   BUN 5* 7*        A/P:  34yo M with right fifth metacarpal neck fracture  NPO  OR today  Pain control  Discussed risks and benefits of surgery.       Patient Active Problem List   Diagnosis   • Trauma   • Contraindication to deep vein thrombosis (DVT) prophylaxis   • Screening examination for infectious disease   • Pneumohemothorax, traumatic, initial encounter   • Fracture of one rib, left side, initial encounter for closed fracture   • Fracture of manubrium, initial encounter for closed fracture   • Mult fractures of thoracic spine, closed, initial encounter ([facility])   • Multiple fractures of cervical spine, initial encounter ([facility])   • Closed fracture of fifth metacarpal bone of right hand, initial encounter   • Injury of vertebral artery, left, initial encounter   • Acute urinary retention   • Discharge planning issues     "

## 2021-05-27 NOTE — OR NURSING
"Patient recovered well in post-op. AAOx4. VSS, on 2L via NC. Surgical sites SELWYN, surgical dressing in place CDI. R hand warm/pink, cap refill < 3 seconds, R 5th digit numb. Pain \"tolerable\" throughout recovery per patient and declined offered pain medication and interventions. Patient declines nausea. Spouse updated and discussed POC. No personal belonging in recovery. Report called to DICK Wong. Patient transported to [facility] on monitor w/ RN. O2 tank full for transport.  "

## 2021-05-27 NOTE — ANESTHESIA TIME REPORT
Anesthesia Start and Stop Event Times     Date Time Event    5/27/2021 1411 Ready for Procedure     1417 Anesthesia Start     1530 Anesthesia Stop        Responsible Staff  05/27/21    Name Role Begin End    Veena Garcia M.D. Anesth 1417 1530        Preop Diagnosis (Free Text):  Pre-op Diagnosis             Preop Diagnosis (Codes):    Post op Diagnosis  Trauma  rigth 5th metacarpal fracture    Premium Reason  A. 3PM - 7AM    Comments:

## 2021-05-28 ENCOUNTER — APPOINTMENT (OUTPATIENT)
Dept: RADIOLOGY | Facility: MEDICAL CENTER | Age: 36
DRG: 513 | End: 2021-05-28
Attending: SURGERY
Payer: COMMERCIAL

## 2021-05-28 PROBLEM — R33.8 ACUTE URINARY RETENTION: Status: RESOLVED | Noted: 2021-05-23 | Resolved: 2021-05-28

## 2021-05-28 PROCEDURE — A9270 NON-COVERED ITEM OR SERVICE: HCPCS | Performed by: NURSE PRACTITIONER

## 2021-05-28 PROCEDURE — A9270 NON-COVERED ITEM OR SERVICE: HCPCS | Performed by: PHYSICAL MEDICINE & REHABILITATION

## 2021-05-28 PROCEDURE — 71045 X-RAY EXAM CHEST 1 VIEW: CPT

## 2021-05-28 PROCEDURE — 700111 HCHG RX REV CODE 636 W/ 250 OVERRIDE (IP): Performed by: SURGERY

## 2021-05-28 PROCEDURE — A9270 NON-COVERED ITEM OR SERVICE: HCPCS | Performed by: SURGERY

## 2021-05-28 PROCEDURE — 700102 HCHG RX REV CODE 250 W/ 637 OVERRIDE(OP): Performed by: PHYSICAL MEDICINE & REHABILITATION

## 2021-05-28 PROCEDURE — 700102 HCHG RX REV CODE 250 W/ 637 OVERRIDE(OP): Performed by: NURSE PRACTITIONER

## 2021-05-28 PROCEDURE — 700102 HCHG RX REV CODE 250 W/ 637 OVERRIDE(OP): Performed by: SURGERY

## 2021-05-28 PROCEDURE — 99232 SBSQ HOSP IP/OBS MODERATE 35: CPT | Performed by: SURGERY

## 2021-05-28 PROCEDURE — 770001 HCHG ROOM/CARE - MED/SURG/GYN PRIV*

## 2021-05-28 PROCEDURE — 700111 HCHG RX REV CODE 636 W/ 250 OVERRIDE (IP): Performed by: NURSE PRACTITIONER

## 2021-05-28 RX ORDER — GABAPENTIN 300 MG/1
300 CAPSULE ORAL 3 TIMES DAILY
Status: DISCONTINUED | OUTPATIENT
Start: 2021-05-28 | End: 2021-05-30 | Stop reason: HOSPADM

## 2021-05-28 RX ADMIN — MORPHINE SULFATE 15 MG: 15 TABLET, FILM COATED, EXTENDED RELEASE ORAL at 16:19

## 2021-05-28 RX ADMIN — GABAPENTIN 300 MG: 300 CAPSULE ORAL at 11:54

## 2021-05-28 RX ADMIN — DOCUSATE SODIUM 100 MG: 100 CAPSULE, LIQUID FILLED ORAL at 05:29

## 2021-05-28 RX ADMIN — MORPHINE SULFATE 15 MG: 15 TABLET, FILM COATED, EXTENDED RELEASE ORAL at 05:29

## 2021-05-28 RX ADMIN — HYDROMORPHONE HYDROCHLORIDE 1 MG: 1 INJECTION, SOLUTION INTRAMUSCULAR; INTRAVENOUS; SUBCUTANEOUS at 18:22

## 2021-05-28 RX ADMIN — ACETAMINOPHEN 1000 MG: 500 TABLET ORAL at 05:29

## 2021-05-28 RX ADMIN — ONDANSETRON 4 MG: 2 INJECTION INTRAMUSCULAR; INTRAVENOUS at 17:56

## 2021-05-28 RX ADMIN — ENOXAPARIN SODIUM 30 MG: 30 INJECTION SUBCUTANEOUS at 16:19

## 2021-05-28 RX ADMIN — METHOCARBAMOL 500 MG: 500 TABLET ORAL at 07:31

## 2021-05-28 RX ADMIN — METHOCARBAMOL 500 MG: 500 TABLET ORAL at 11:54

## 2021-05-28 RX ADMIN — ACETAMINOPHEN 1000 MG: 500 TABLET ORAL at 11:54

## 2021-05-28 RX ADMIN — ACETAMINOPHEN 1000 MG: 500 TABLET ORAL at 21:36

## 2021-05-28 RX ADMIN — GABAPENTIN 300 MG: 300 CAPSULE ORAL at 16:19

## 2021-05-28 RX ADMIN — OXYCODONE HYDROCHLORIDE 10 MG: 10 TABLET ORAL at 02:59

## 2021-05-28 RX ADMIN — DOCUSATE SODIUM 100 MG: 100 CAPSULE, LIQUID FILLED ORAL at 16:19

## 2021-05-28 RX ADMIN — HYDROMORPHONE HYDROCHLORIDE 1 MG: 1 INJECTION, SOLUTION INTRAMUSCULAR; INTRAVENOUS; SUBCUTANEOUS at 13:13

## 2021-05-28 RX ADMIN — OXYCODONE HYDROCHLORIDE 10 MG: 10 TABLET ORAL at 07:31

## 2021-05-28 RX ADMIN — OXYCODONE HYDROCHLORIDE 10 MG: 10 TABLET ORAL at 20:07

## 2021-05-28 RX ADMIN — METHOCARBAMOL 500 MG: 500 TABLET ORAL at 16:19

## 2021-05-28 RX ADMIN — OXYCODONE HYDROCHLORIDE 10 MG: 10 TABLET ORAL at 16:19

## 2021-05-28 RX ADMIN — ENOXAPARIN SODIUM 30 MG: 30 INJECTION SUBCUTANEOUS at 05:28

## 2021-05-28 RX ADMIN — OXYCODONE HYDROCHLORIDE 10 MG: 10 TABLET ORAL at 23:26

## 2021-05-28 RX ADMIN — OXYCODONE HYDROCHLORIDE 10 MG: 10 TABLET ORAL at 11:54

## 2021-05-28 RX ADMIN — FAMOTIDINE 20 MG: 20 TABLET ORAL at 05:29

## 2021-05-28 RX ADMIN — METHOCARBAMOL 500 MG: 500 TABLET ORAL at 20:07

## 2021-05-28 RX ADMIN — HYDROMORPHONE HYDROCHLORIDE 1 MG: 1 INJECTION, SOLUTION INTRAMUSCULAR; INTRAVENOUS; SUBCUTANEOUS at 05:28

## 2021-05-28 RX ADMIN — HYDROMORPHONE HYDROCHLORIDE 1 MG: 1 INJECTION, SOLUTION INTRAMUSCULAR; INTRAVENOUS; SUBCUTANEOUS at 21:36

## 2021-05-28 ASSESSMENT — PAIN DESCRIPTION - PAIN TYPE
TYPE: ACUTE PAIN
TYPE: ACUTE PAIN;SURGICAL PAIN
TYPE: ACUTE PAIN

## 2021-05-28 ASSESSMENT — ENCOUNTER SYMPTOMS
PSYCHIATRIC NEGATIVE: 1
NAUSEA: 0
NEUROLOGICAL NEGATIVE: 1
EYES NEGATIVE: 1
CONSTITUTIONAL NEGATIVE: 1
NECK PAIN: 1
ROS GI COMMENTS: BM 5/27
VOMITING: 0
RESPIRATORY NEGATIVE: 1
BACK PAIN: 1

## 2021-05-28 ASSESSMENT — FIBROSIS 4 INDEX: FIB4 SCORE: 1.12

## 2021-05-28 NOTE — THERAPY
Missed Therapy     Patient Name: Rafael Perla  Age:  35 y.o., Sex:  male  Medical Record #: [id number]  Today's Date: 5/28/2021    Discussed missed therapy with nursing       05/28/21 1400   Interdisciplinary Plan of Care Collaboration   Collaboration Comments patient deferred 2/2 10/10 pain at chest tube site. He is agreeable to working with PT tomorrow for further PT and caregiver training.         05/28/21 1400   Interdisciplinary Plan of Care Collaboration   Collaboration Comments patient deferred 2/2 10/10 pain at chest tube site. He is agreeable to working with PT tomorrow for further PT and caregiver training.

## 2021-05-28 NOTE — PROGRESS NOTES
"   Orthopaedic PA Progress Note    Interval changes:comfortable in bed    ROS - Patient denies any new issues. No chest pain, dyspnea, or fever.  Pain well controlled.    /86   Pulse 81   Temp 36.7 °C (98 °F) (Temporal)   Resp 18   Ht 1.905 m (6' 3\")   Wt 90 kg (198 lb 6.6 oz)   SpO2 96%     Patient seen and examined  No acute distress  Breathing non labored  RRR -splint intact              -EPL/FPL intact              -SILT M/U/R/AIN/PIN/Msc/Ax nerves              -+WF/WE/EDC//IO/terminal digit flex/ext              -compartments soft and compressible              -pulses palpable, brisk capillary refill      Recent Labs     05/26/21  0440   WBC 9.0   RBC 4.23*   HEMOGLOBIN 13.2*   HEMATOCRIT 39.7*   MCV 93.9   MCH 31.2   MCHC 33.2*   RDW 42.5   PLATELETCT 202   MPV 10.4       Active Hospital Problems    Diagnosis    • Pneumohemothorax, traumatic, initial encounter [S27.2XXA]      Priority: High   • Fracture of one rib, left side, initial encounter for closed fracture [S22.32XA]      Priority: High   • Fracture of manubrium, initial encounter for closed fracture [S22.21XA]      Priority: High   • Mult fractures of thoracic spine, closed, initial encounter (HCC) [S22.009A]      Priority: High   • Multiple fractures of cervical spine, initial encounter (HCC) [S12.9XXA]      Priority: High   • Closed fracture of fifth metacarpal bone of right hand, initial encounter [S62.306A]      Priority: Medium   • Injury of vertebral artery, left, initial encounter [S15.102A]      Priority: Medium   • Discharge planning issues [Z02.9]      Priority: Medium   • Trauma [T14.90XA]      Priority: Low   • Contraindication to deep vein thrombosis (DVT) prophylaxis [Z53.09]      Priority: Low   • Screening examination for infectious disease [Z11.9]      Priority: Low       Assessment/Plan:  POD#1 S/P IMN R 5th MC shaft  Wt bearing status - NWB R hand  PT/OT-initiated  Wound care:Ortho team to manage wound care beneath all " splints. Call [phone] if concerns  Case Coordination for Discharge Planning - Disposition per Trauma  Follow-Up: needs appointment with Dr. Hernandez at [facility] at 10-14 days post-op for re-evaluation, staple removal and radiographs.
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
## 2021-05-28 NOTE — DISCHARGE PLANNING
POD 1 ORIF L 5th metacarpal fx.  Would appreciate updated TX evals once appropriate.  IV Dil x3 in the past 24h-improvement.  Chest tube remains a barrier.  If Rafael does not admit today then will require updated PT/OT evals on Monday for review by Physiatry & insurance please.     1447-Insurance has authorized [facility].  Current barrier is  A chest tube.  Please utilize PO analgesics when able. Dr. Roberts will be getting in touch with Dr. Gill regarding a possible Mary brace.  Possible admit tomorrow.  Clemencia CANTU and Dr. Roberts to f/u in the am.  Msg placed to TORITO Bonilla.    1524-Spoke with Rafael and his Father regarding [facility].  They were hoping to D/C directly home.  Rafael will consider [facility].
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
## 2021-05-28 NOTE — CARE PLAN
The patient is Stable - Low risk of patient condition declining or worsening  Problem: Hemodynamics  Goal: Patient's hemodynamics, fluid balance and neurologic status will be stable or improve  Outcome: Progressing     Problem: Respiratory  Goal: Patient will achieve/maintain optimum respiratory ventilation and gas exchange  Outcome: Progressing       Shift Goals  Clinical Goals: Pain control and mobility  Patient Goals: Work with IS    Progress made toward(s) clinical / shift goals:  yes    Patient is not progressing towards the following goals:

## 2021-05-29 ENCOUNTER — APPOINTMENT (OUTPATIENT)
Dept: RADIOLOGY | Facility: MEDICAL CENTER | Age: 36
DRG: 513 | End: 2021-05-29
Attending: NURSE PRACTITIONER
Payer: COMMERCIAL

## 2021-05-29 ENCOUNTER — APPOINTMENT (OUTPATIENT)
Dept: RADIOLOGY | Facility: MEDICAL CENTER | Age: 36
DRG: 513 | End: 2021-05-29
Attending: SURGERY
Payer: COMMERCIAL

## 2021-05-29 PROBLEM — Z78.9 NO CONTRAINDICATION TO DEEP VEIN THROMBOSIS (DVT) PROPHYLAXIS: Status: ACTIVE | Noted: 2021-05-23

## 2021-05-29 PROCEDURE — 700102 HCHG RX REV CODE 250 W/ 637 OVERRIDE(OP): Performed by: NURSE PRACTITIONER

## 2021-05-29 PROCEDURE — 700102 HCHG RX REV CODE 250 W/ 637 OVERRIDE(OP): Performed by: SURGERY

## 2021-05-29 PROCEDURE — 700111 HCHG RX REV CODE 636 W/ 250 OVERRIDE (IP): Performed by: SURGERY

## 2021-05-29 PROCEDURE — A9270 NON-COVERED ITEM OR SERVICE: HCPCS | Performed by: SURGERY

## 2021-05-29 PROCEDURE — A9270 NON-COVERED ITEM OR SERVICE: HCPCS | Performed by: NURSE PRACTITIONER

## 2021-05-29 PROCEDURE — 700102 HCHG RX REV CODE 250 W/ 637 OVERRIDE(OP): Performed by: PHYSICAL MEDICINE & REHABILITATION

## 2021-05-29 PROCEDURE — A9270 NON-COVERED ITEM OR SERVICE: HCPCS | Performed by: PHYSICAL MEDICINE & REHABILITATION

## 2021-05-29 PROCEDURE — 71045 X-RAY EXAM CHEST 1 VIEW: CPT

## 2021-05-29 PROCEDURE — 700101 HCHG RX REV CODE 250: Performed by: SURGERY

## 2021-05-29 PROCEDURE — 700111 HCHG RX REV CODE 636 W/ 250 OVERRIDE (IP): Performed by: NURSE PRACTITIONER

## 2021-05-29 PROCEDURE — 770006 HCHG ROOM/CARE - MED/SURG/GYN SEMI*

## 2021-05-29 RX ADMIN — LIDOCAINE 2 PATCH: 50 PATCH TOPICAL at 12:24

## 2021-05-29 RX ADMIN — GABAPENTIN 300 MG: 300 CAPSULE ORAL at 12:24

## 2021-05-29 RX ADMIN — METHOCARBAMOL 500 MG: 500 TABLET ORAL at 08:56

## 2021-05-29 RX ADMIN — ENOXAPARIN SODIUM 30 MG: 30 INJECTION SUBCUTANEOUS at 17:46

## 2021-05-29 RX ADMIN — METHOCARBAMOL 500 MG: 500 TABLET ORAL at 12:25

## 2021-05-29 RX ADMIN — HYDROMORPHONE HYDROCHLORIDE 1 MG: 1 INJECTION, SOLUTION INTRAMUSCULAR; INTRAVENOUS; SUBCUTANEOUS at 12:43

## 2021-05-29 RX ADMIN — HYDROMORPHONE HYDROCHLORIDE 1 MG: 1 INJECTION, SOLUTION INTRAMUSCULAR; INTRAVENOUS; SUBCUTANEOUS at 17:14

## 2021-05-29 RX ADMIN — HYDROMORPHONE HYDROCHLORIDE 1 MG: 1 INJECTION, SOLUTION INTRAMUSCULAR; INTRAVENOUS; SUBCUTANEOUS at 04:52

## 2021-05-29 RX ADMIN — HYDROMORPHONE HYDROCHLORIDE 1 MG: 1 INJECTION, SOLUTION INTRAMUSCULAR; INTRAVENOUS; SUBCUTANEOUS at 00:35

## 2021-05-29 RX ADMIN — POLYETHYLENE GLYCOL 3350 1 PACKET: 17 POWDER, FOR SOLUTION ORAL at 17:44

## 2021-05-29 RX ADMIN — OXYCODONE HYDROCHLORIDE 10 MG: 10 TABLET ORAL at 22:52

## 2021-05-29 RX ADMIN — DOCUSATE SODIUM 100 MG: 100 CAPSULE, LIQUID FILLED ORAL at 17:44

## 2021-05-29 RX ADMIN — OXYCODONE HYDROCHLORIDE 10 MG: 10 TABLET ORAL at 19:57

## 2021-05-29 RX ADMIN — METHOCARBAMOL 500 MG: 500 TABLET ORAL at 22:55

## 2021-05-29 RX ADMIN — GABAPENTIN 300 MG: 300 CAPSULE ORAL at 12:23

## 2021-05-29 RX ADMIN — METHOCARBAMOL 500 MG: 500 TABLET ORAL at 19:57

## 2021-05-29 RX ADMIN — GABAPENTIN 300 MG: 300 CAPSULE ORAL at 05:07

## 2021-05-29 RX ADMIN — GABAPENTIN 300 MG: 300 CAPSULE ORAL at 17:44

## 2021-05-29 RX ADMIN — OXYCODONE 5 MG: 5 TABLET ORAL at 07:42

## 2021-05-29 RX ADMIN — MORPHINE SULFATE 15 MG: 15 TABLET, FILM COATED, EXTENDED RELEASE ORAL at 17:45

## 2021-05-29 RX ADMIN — DOCUSATE SODIUM 50 MG AND SENNOSIDES 8.6 MG 1 TABLET: 8.6; 5 TABLET, FILM COATED ORAL at 22:52

## 2021-05-29 RX ADMIN — OXYCODONE HYDROCHLORIDE 10 MG: 10 TABLET ORAL at 16:20

## 2021-05-29 RX ADMIN — ENOXAPARIN SODIUM 30 MG: 30 INJECTION SUBCUTANEOUS at 05:06

## 2021-05-29 RX ADMIN — MORPHINE SULFATE 15 MG: 15 TABLET, FILM COATED, EXTENDED RELEASE ORAL at 05:07

## 2021-05-29 RX ADMIN — ONDANSETRON 4 MG: 2 INJECTION INTRAMUSCULAR; INTRAVENOUS at 04:57

## 2021-05-29 RX ADMIN — ACETAMINOPHEN 1000 MG: 500 TABLET ORAL at 12:23

## 2021-05-29 RX ADMIN — OXYCODONE HYDROCHLORIDE 10 MG: 10 TABLET ORAL at 08:56

## 2021-05-29 ASSESSMENT — PAIN DESCRIPTION - PAIN TYPE
TYPE: ACUTE PAIN

## 2021-05-29 ASSESSMENT — ENCOUNTER SYMPTOMS
RESPIRATORY NEGATIVE: 1
BACK PAIN: 1
EYES NEGATIVE: 1
NEUROLOGICAL NEGATIVE: 1
NECK PAIN: 1
PSYCHIATRIC NEGATIVE: 1
ROS GI COMMENTS: BM 5/28
VOMITING: 0
CONSTITUTIONAL NEGATIVE: 1
NAUSEA: 0
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
## 2021-05-29 NOTE — PROGRESS NOTES
1100: Providers at bedside to clamp chest tube followed by a pending X-ray at 3:00pm to further assess its potential removal.     1645: Report given and patient transferred to Room 408-1.

## 2021-05-29 NOTE — CARE PLAN
Problem: Knowledge Deficit - Standard  Goal: Patient and family/care givers will demonstrate understanding of plan of care, disease process/condition, diagnostic tests and medications  Outcome: Progressing     Problem: Pain - Standard  Goal: Alleviation of pain or a reduction in pain to the patient’s comfort goal  Outcome: Progressing     Problem: Skin Integrity  Goal: Skin integrity is maintained or improved  Outcome: Progressing     Problem: Psychosocial  Goal: Patient's level of anxiety will decrease  Outcome: Progressing  Goal: Patient's ability to verbalize feelings about condition will improve  Outcome: Progressing  Goal: Patient's ability to re-evaluate and adapt role responsibilities will improve  Outcome: Progressing  Goal: Patient and family will demonstrate ability to cope with life altering diagnosis and/or procedure  Outcome: Progressing  Goal: Spiritual and cultural needs incorporated into hospitalization  Outcome: Progressing     Problem: Hemodynamics  Goal: Patient's hemodynamics, fluid balance and neurologic status will be stable or improve  Outcome: Progressing     Problem: Venous Thromboembolism (VTE) Prevention  Goal: The patient will remain free from venous thromboembolism (VTE)  Outcome: Progressing     Problem: Urinary Elimination  Goal: Establish and maintain regular urinary output  Outcome: Progressing     Problem: Respiratory  Goal: Patient will achieve/maintain optimum respiratory ventilation and gas exchange  Outcome: Progressing   The patient is Stable - Low risk of patient condition declining or worsening    Shift Goals  Clinical Goals: Work ith IS, inc mobility, pain control  Patient Goals: Remove the chest tube, floor with shower  Family Goals: Transfer to the floor with bathroom and shower    Progress made toward(s) clinical / shift goals:  Incentive spirometer     Patient is not progressing towards the following goals: pain management

## 2021-05-29 NOTE — PROGRESS NOTES
"   Orthopaedic PA Progress Note    Interval changes:comfortable in chair    ROS - Patient denies any new issues. No chest pain, dyspnea, or fever.  Pain well controlled.    /79   Pulse 83   Temp 37.1 °C (98.7 °F) (Temporal)   Resp 18   Ht 1.905 m (6' 3\")   Wt 90 kg (198 lb 6.6 oz)   SpO2 92%     Patient seen and examined  No acute distress  Breathing non labored  RRR -splint intact              -EPL/FPL intact              -SILT M/U/R/AIN/PIN/Msc/Ax nerves              -+WF/WE/EDC//IO/terminal digit flex/ext              -compartments soft and compressible              -pulses palpable, brisk capillary refill    Active Hospital Problems    Diagnosis    • Pneumohemothorax, traumatic, initial encounter [S27.2XXA]      Priority: High   • Fracture of one rib, left side, initial encounter for closed fracture [S22.32XA]      Priority: High   • Fracture of manubrium, initial encounter for closed fracture [S22.21XA]      Priority: High   • Mult fractures of thoracic spine, closed, initial encounter (HCC) [S22.009A]      Priority: High   • Multiple fractures of cervical spine, initial encounter ([facility]) [S12.9XXA]      Priority: High   • Closed fracture of fifth metacarpal bone of right hand, initial encounter [S62.306A]      Priority: Medium   • Injury of vertebral artery, left, initial encounter [S15.102A]      Priority: Medium   • Discharge planning issues [Z02.9]      Priority: Medium   • Trauma [T14.90XA]      Priority: Low   • No contraindication to deep vein thrombosis (DVT) prophylaxis [Z78.9]      Priority: Low   • Screening examination for infectious disease [Z11.9]      Priority: Low     Assessment/Plan:  POD#2 S/P IMN R 5th MC shaft  Wt bearing status - NWB R hand  PT/OT-initiated  Wound care:Ortho team to manage wound care beneath all splints. Call [phone] if concerns  Case Coordination for Discharge Planning - Disposition per Trauma  Follow-Up: needs appointment with Dr. Hernandez at [facility] at 10-14 days post-op for re-evaluation, staple removal and radiographs.

## 2021-05-30 ENCOUNTER — APPOINTMENT (OUTPATIENT)
Dept: RADIOLOGY | Facility: MEDICAL CENTER | Age: 36
DRG: 513 | End: 2021-05-30
Attending: SURGERY
Payer: COMMERCIAL

## 2021-05-30 ENCOUNTER — PHARMACY VISIT (OUTPATIENT)
Dept: PHARMACY | Facility: MEDICAL CENTER | Age: 36
End: 2021-05-30
Payer: COMMERCIAL

## 2021-05-30 VITALS
WEIGHT: 198.41 LBS | DIASTOLIC BLOOD PRESSURE: 84 MMHG | HEIGHT: 75 IN | RESPIRATION RATE: 18 BRPM | BODY MASS INDEX: 24.67 KG/M2 | SYSTOLIC BLOOD PRESSURE: 135 MMHG | OXYGEN SATURATION: 91 % | HEART RATE: 80 BPM | TEMPERATURE: 97.4 F

## 2021-05-30 PROBLEM — Z11.9 SCREENING EXAMINATION FOR INFECTIOUS DISEASE: Status: RESOLVED | Noted: 2021-05-23 | Resolved: 2021-05-30

## 2021-05-30 PROBLEM — Z75.8 DISCHARGE PLANNING ISSUES: Status: RESOLVED | Noted: 2021-05-23 | Resolved: 2021-05-30

## 2021-05-30 PROBLEM — Z78.9 NO CONTRAINDICATION TO DEEP VEIN THROMBOSIS (DVT) PROPHYLAXIS: Status: RESOLVED | Noted: 2021-05-23 | Resolved: 2021-05-30

## 2021-05-30 PROBLEM — Z02.9 DISCHARGE PLANNING ISSUES: Status: RESOLVED | Noted: 2021-05-23 | Resolved: 2021-05-30

## 2021-05-30 PROCEDURE — A9270 NON-COVERED ITEM OR SERVICE: HCPCS | Performed by: SURGERY

## 2021-05-30 PROCEDURE — 97116 GAIT TRAINING THERAPY: CPT

## 2021-05-30 PROCEDURE — 97530 THERAPEUTIC ACTIVITIES: CPT

## 2021-05-30 PROCEDURE — 700111 HCHG RX REV CODE 636 W/ 250 OVERRIDE (IP): Performed by: SURGERY

## 2021-05-30 PROCEDURE — 71045 X-RAY EXAM CHEST 1 VIEW: CPT

## 2021-05-30 PROCEDURE — RXMED WILLOW AMBULATORY MEDICATION CHARGE: Performed by: NURSE PRACTITIONER

## 2021-05-30 PROCEDURE — 700102 HCHG RX REV CODE 250 W/ 637 OVERRIDE(OP): Performed by: SURGERY

## 2021-05-30 PROCEDURE — A9270 NON-COVERED ITEM OR SERVICE: HCPCS | Performed by: NURSE PRACTITIONER

## 2021-05-30 PROCEDURE — 700102 HCHG RX REV CODE 250 W/ 637 OVERRIDE(OP): Performed by: PHYSICAL MEDICINE & REHABILITATION

## 2021-05-30 PROCEDURE — 97535 SELF CARE MNGMENT TRAINING: CPT

## 2021-05-30 PROCEDURE — 700102 HCHG RX REV CODE 250 W/ 637 OVERRIDE(OP): Performed by: NURSE PRACTITIONER

## 2021-05-30 PROCEDURE — A9270 NON-COVERED ITEM OR SERVICE: HCPCS | Performed by: PHYSICAL MEDICINE & REHABILITATION

## 2021-05-30 RX ORDER — LIDOCAINE 50 MG/G
2 PATCH TOPICAL EVERY 24 HOURS
Qty: 10 PATCH | Refills: 0 | Status: SHIPPED | OUTPATIENT
Start: 2021-05-31

## 2021-05-30 RX ORDER — METHOCARBAMOL 500 MG/1
500 TABLET, FILM COATED ORAL 3 TIMES DAILY PRN
Qty: 48 TABLET | Refills: 0 | Status: SHIPPED | OUTPATIENT
Start: 2021-05-30 | End: 2021-06-19

## 2021-05-30 RX ORDER — OXYCODONE HYDROCHLORIDE 5 MG/1
5-10 TABLET ORAL
Qty: 50 TABLET | Refills: 0 | Status: SHIPPED | OUTPATIENT
Start: 2021-05-30 | End: 2021-06-06

## 2021-05-30 RX ORDER — MORPHINE SULFATE 15 MG/1
15 TABLET, FILM COATED, EXTENDED RELEASE ORAL EVERY 12 HOURS
Qty: 14 TABLET | Refills: 0 | Status: SHIPPED | OUTPATIENT
Start: 2021-05-30 | End: 2021-06-06

## 2021-05-30 RX ORDER — GABAPENTIN 300 MG/1
300 CAPSULE ORAL 3 TIMES DAILY
Qty: 63 CAPSULE | Refills: 0 | Status: SHIPPED | OUTPATIENT
Start: 2021-05-30 | End: 2021-06-20

## 2021-05-30 RX ORDER — ACETAMINOPHEN 500 MG
1000 TABLET ORAL EVERY 6 HOURS PRN
Qty: 30 TABLET | Refills: 0 | COMMUNITY
Start: 2021-05-30

## 2021-05-30 RX ADMIN — OXYCODONE HYDROCHLORIDE 10 MG: 10 TABLET ORAL at 11:32

## 2021-05-30 RX ADMIN — DOCUSATE SODIUM 100 MG: 100 CAPSULE, LIQUID FILLED ORAL at 04:59

## 2021-05-30 RX ADMIN — OXYCODONE HYDROCHLORIDE 10 MG: 10 TABLET ORAL at 02:07

## 2021-05-30 RX ADMIN — POLYETHYLENE GLYCOL 3350 1 PACKET: 17 POWDER, FOR SOLUTION ORAL at 05:00

## 2021-05-30 RX ADMIN — GABAPENTIN 300 MG: 300 CAPSULE ORAL at 11:31

## 2021-05-30 RX ADMIN — GABAPENTIN 300 MG: 300 CAPSULE ORAL at 04:59

## 2021-05-30 RX ADMIN — ACETAMINOPHEN 1000 MG: 500 TABLET ORAL at 02:07

## 2021-05-30 RX ADMIN — METHOCARBAMOL 500 MG: 500 TABLET ORAL at 12:48

## 2021-05-30 RX ADMIN — MORPHINE SULFATE 15 MG: 15 TABLET, FILM COATED, EXTENDED RELEASE ORAL at 04:59

## 2021-05-30 RX ADMIN — ENOXAPARIN SODIUM 30 MG: 30 INJECTION SUBCUTANEOUS at 05:00

## 2021-05-30 RX ADMIN — OXYCODONE HYDROCHLORIDE 10 MG: 10 TABLET ORAL at 14:41

## 2021-05-30 RX ADMIN — OXYCODONE HYDROCHLORIDE 10 MG: 10 TABLET ORAL at 05:05

## 2021-05-30 RX ADMIN — OXYCODONE HYDROCHLORIDE 10 MG: 10 TABLET ORAL at 08:34

## 2021-05-30 RX ADMIN — MAGNESIUM HYDROXIDE 30 ML: 400 SUSPENSION ORAL at 04:59

## 2021-05-30 RX ADMIN — METHOCARBAMOL 500 MG: 500 TABLET ORAL at 08:34

## 2021-05-30 ASSESSMENT — COGNITIVE AND FUNCTIONAL STATUS - GENERAL
WALKING IN HOSPITAL ROOM: A LITTLE
DRESSING REGULAR UPPER BODY CLOTHING: A LOT
HELP NEEDED FOR BATHING: A LOT
EATING MEALS: A LITTLE
TOILETING: A LITTLE
SUGGESTED CMS G CODE MODIFIER DAILY ACTIVITY: CK
DAILY ACTIVITIY SCORE: 15
SUGGESTED CMS G CODE MODIFIER MOBILITY: CJ
DRESSING REGULAR LOWER BODY CLOTHING: A LOT
MOBILITY SCORE: 22
PERSONAL GROOMING: A LITTLE
CLIMB 3 TO 5 STEPS WITH RAILING: A LITTLE

## 2021-05-30 ASSESSMENT — GAIT ASSESSMENTS
DISTANCE (FEET): 400
GAIT LEVEL OF ASSIST: SUPERVISED
DEVIATION: BRADYKINETIC

## 2021-05-30 ASSESSMENT — ENCOUNTER SYMPTOMS
NECK PAIN: 1
RESPIRATORY NEGATIVE: 1
ROS GI COMMENTS: BM 5/28
EYES NEGATIVE: 1
NEUROLOGICAL NEGATIVE: 1
CONSTITUTIONAL NEGATIVE: 1
VOMITING: 0
BACK PAIN: 1
NAUSEA: 0
PSYCHIATRIC NEGATIVE: 1

## 2021-05-30 ASSESSMENT — PAIN DESCRIPTION - PAIN TYPE
TYPE: ACUTE PAIN

## 2021-05-30 NOTE — DISCHARGE PLANNING
[facility] Transitional Care Coordination    Follow up for Rehab.  Current documentation no longer reflects ongoing acute therapy need in 2 of 3 therapy disciplines meeting CMS criteria for IRF level care.  PT 5/30 indicates Supervised level for bed and functional mobility, ambulated 400 ft without assistive device.  6 clicks mobility score 22.  There is no documented SLP need.   Anticipate home with HH/Outpatient support.  If there are interval changes, please reach out to Rehab TCC team [phone].

## 2021-05-30 NOTE — PROGRESS NOTES
Assumed care at 1845. Pt resting in bed. A&ox 4  Ambulated to BR with 1 person assist  O2 on RA  Tolerating diet  No bm today  Voiding adequately  [place] j collar in place  Left old CT site dressing CDI  Pain controlled  Call light within reach. Hourly rounding in place

## 2021-05-30 NOTE — DISCHARGE PLANNING
Meds-to-Beds: Discharge prescription orders listed below delivered to patient's bedside.Judith RN notified. Patient familiar with meds and will call with any questions.     Rafael Perla   Home Medication Instructions SITA:[id number]    Printed on:05/30/21 8965   Medication Information                      gabapentin (NEURONTIN) 300 MG Cap  Take 1 capsule by mouth 3 times a day for 21 days.             methocarbamol (ROBAXIN) 500 MG Tab  Take 1 tablet by mouth 3 times a day as needed for up to 20 days.             morphine ER (MS CONTIN) 15 MG Tab CR tablet  Take 1 tablet by mouth every 12 hours for 7 days.             oxyCODONE immediate-release (ROXICODONE) 5 MG Tab  Take 1-2 Tablets by mouth every 3 hours as needed for up to 7 days.                 Maddy Simms, PharmD

## 2021-05-30 NOTE — CARE PLAN
Problem: Knowledge Deficit - Standard  Goal: Patient and family/care givers will demonstrate understanding of plan of care, disease process/condition, diagnostic tests and medications  Outcome: Progressing   Patient and family has understanding of POC.  Problem: Pain - Standard  Goal: Alleviation of pain or a reduction in pain to the patient’s comfort goal  Outcome: Progressing  Patient voices needs appropriately.     The patient is Stable - Low risk of patient condition declining or worsening    Shift Goals  Clinical Goals: pain control and ambulate  Patient Goals: Remove the chest tube, floor with shower  Family Goals: Transfer to the floor with bathroom and shower

## 2021-05-30 NOTE — PROGRESS NOTES
"   Orthopaedic PA Progress Note    Interval changes:chest tube removed yesterday, CXR today s PTX    ROS - Patient denies any new issues. No chest pain, dyspnea, or fever.  Pain well controlled.    /84   Pulse 80   Temp 36.3 °C (97.4 °F) (Temporal)   Resp 18   Ht 1.905 m (6' 3\")   Wt 90 kg (198 lb 6.6 oz)   SpO2 91%     Patient seen and examined  No acute distress  Breathing non labored  RRR -splint intact              -EPL/FPL intact              -SILT M/U/R/AIN/PIN/Msc/Ax nerves              -+WF/WE/EDC//IO/terminal digit flex/ext              -compartments soft and compressible              -pulses palpable, brisk capillary refill    Active Hospital Problems    Diagnosis    • Pneumohemothorax, traumatic, initial encounter [S27.2XXA]      Priority: High   • Fracture of one rib, left side, initial encounter for closed fracture [S22.32XA]      Priority: High   • Fracture of manubrium, initial encounter for closed fracture [S22.21XA]      Priority: High   • Mult fractures of thoracic spine, closed, initial encounter (HCC) [S22.009A]      Priority: High   • Multiple fractures of cervical spine, initial encounter (HCC) [S12.9XXA]      Priority: High   • Closed fracture of fifth metacarpal bone of right hand, initial encounter [S62.306A]      Priority: Medium   • Injury of vertebral artery, left, initial encounter [S15.102A]      Priority: Medium   • Discharge planning issues [Z02.9]      Priority: Medium   • Trauma [T14.90XA]      Priority: Low   • No contraindication to deep vein thrombosis (DVT) prophylaxis [Z78.9]      Priority: Low   • Screening examination for infectious disease [Z11.9]      Priority: Low     Assessment/Plan:  POD#3 S/P IMN R 5th MC shaft  Wt bearing status - NWB R hand  PT/OT-initiated  Wound care:Ortho team to manage wound care beneath all splints. Call [phone] if concerns  Case Coordination for Discharge Planning - Disposition per Trauma  Follow-Up: needs appointment with Dr. Hernandez " at [facility] at 10-14 days post-op for re-evaluation, staple removal and radiographs.

## 2021-05-30 NOTE — PROGRESS NOTES
Discharging Patient home per physician order.  Discharged with family.  Demonstrated understanding of discharge instructions, follow up appointments, home medications, prescriptions, home care for surgical wound and nursing care instructions for brace.  Ambulating with stand-by assistance, voiding without difficulty, pain well controlled, tolerating oral medications, oxygen saturation greater than 90%, tolerating diet.  Educational handouts on S/S given and discussed.  Verbalized understanding of discharge instructions and educational handouts.  All questions answered.  Belongings with patient at time of discharge.

## 2021-05-30 NOTE — DISCHARGE INSTRUCTIONS
Discharge Instructions    Discharged to home by car with relative. Discharged via wheelchair, hospital escort: Yes.  Special equipment needed: TLSO    Be sure to schedule a follow-up appointment with your primary care doctor or any specialists as instructed.     Discharge Plan:   Diet Plan: Discussed  Activity Level: Discussed  Confirmed Follow up Appointment: Patient to Call and Schedule Appointment  Confirmed Symptoms Management: Discussed  Medication Reconciliation Updated: Yes    I understand that a diet low in cholesterol, fat, and sodium is recommended for good health. Unless I have been given specific instructions below for another diet, I accept this instruction as my diet prescription.   Other diet: Regular    Special Instructions: None    · Is patient discharged on Warfarin / Coumadin?   No     Depression / Suicide Risk    As you are discharged from this [facility], it is important to learn how to keep safe from harming yourself.    Recognize the warning signs:  · Abrupt changes in personality, positive or negative- including increase in energy   · Giving away possessions  · Change in eating patterns- significant weight changes-  positive or negative  · Change in sleeping patterns- unable to sleep or sleeping all the time   · Unwillingness or inability to communicate  · Depression  · Unusual sadness, discouragement and loneliness  · Talk of wanting to die  · Neglect of personal appearance   · Rebelliousness- reckless behavior  · Withdrawal from people/activities they love  · Confusion- inability to concentrate     If you or a loved one observes any of these behaviors or has concerns about self-harm, here's what you can do:  · Talk about it- your feelings and reasons for harming yourself  · Remove any means that you might use to hurt yourself (examples: pills, rope, extension cords, firearm)  · Get professional help from the community (Mental Health, Substance Abuse, psychological  counseling)  · Do not be alone:Call your Safe Contact- someone whom you trust who will be there for you.  · Call your local CRISIS HOTLINE 091-7551 or 040-482-3122  · Call your local Children's Mobile Crisis Response Team [place] (972) 247-4149 or www.Gravy  · Call the toll free National Suicide Prevention Hotlines   · National Suicide Prevention Lifeline 553-985-RBEI (2534)  · [place] Line Network 800-SUICIDE (072-9494)    - Call or seek medical attention for questions or concerns  - Follow up with Orthopedics and neurosurgery  in 1-2 weeks time. Call his private office to schedule follow up.   - Follow up with primary care provider within one weeks time. Review your home medication with your primary care doctor.    - Resume regular diet  - May take over the counter acetaminophen. Avoid NSAIDS, Aspirin, Ibuprofen if you have been advise to do so by your Neurosurgeon. You can call his office to ask if you are able to take Ibuprofen.  - May use OTC 4% lidocaine patches if unable to fill Lidoderm patches  - Continue daily over the counter stool softener while on narcotics  - No operation of machinery or motorized vehicles while under the influence of narcotics  - No alcohol, marijuana or illicit drug use while under the influence of narcotics  - In the event of a narcotic overdose naloxone (Narcan) is available without a prescription from any [facility]  - Hospital staff are unable to refill your pain medication. You will need to contact your PCP or surgeon's office for refills  - No swimming, hot tubs, baths or wound submersion until cleared by outpatient provider. May shower  - No contact sports, strenuous activities, or heavy lifting until cleared by outpatient provider  - If respiratory decompensation, persistent or worsening pain, fever or signs or symptoms of infection occur seek medical attention    Dr. Hernandez at [facility] at 10-14 days post-op for  re-evaluation, staple removal and radiographs.

## 2021-05-30 NOTE — PROGRESS NOTES
Bedside report received. Assessment completed.  Pt is A&O x4. Pt on room air.   Medicating for pain PRN per MAR Pain 9/10  Denies nausea.   - numbness, - tingling.  Skin see flowsheet, [place] J collar in place.   LDA CDI   Last BM 5/30/21, +flatus, +void.  Regular diet. Tolerates well.   Pt up with stand-by assist.  Call light and belongings within reach. All needs met at this time. Fall Precautions and hourly rounding in place.
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
## 2021-05-30 NOTE — PROGRESS NOTES
"/79   Pulse 83   Temp 37.1 °C (98.7 °F) (Temporal)   Resp 18   Ht 1.905 m (6' 3\")   Wt 90 kg (198 lb 6.6 oz)   SpO2 92%   Patient moved to room 408-1  Chest tube pulled without complication  Chest Xray in am  "

## 2021-05-30 NOTE — THERAPY
"Physical Therapy   Daily Treatment     Patient Name: Rafael Perla  Age:  35 y.o., Sex:  male  Medical Record #: [id number]  Today's Date: 5/30/2021     Precautions: Fall Risk, Spinal / Back Precautions , Non Weight Bearing Right Upper Extremity    Assessment    Patient seen for follow up PT session today. He demos improvement in independence with mobility. He is bale to get to EOB with supervision using HOB elevated and maintaining spine precautions. He will have a hospital bed at home.  Patient able to ambulate and perform stairs with supervision and no LOB.  Patient and family educated about spine precautions and necessary DME.  Patient and family are in agreement that they would like to go home and do not need HHPT.  Patient will benefit from OP PT follow up once spine precautions are liberalized.  Patient is safe for DC home with assist from family as needed    Plan    Treatment plan modified to 3 times per week until therapy goals are met for the following treatments:  Bed Mobility, Gait Training, Neuro Re-Education / Balance, Therapeutic Activities and Therapeutic Exercises.    DC Equipment Recommendations: Tub / Shower Seat, Bed Side Commode (family will be purchasing)  Discharge Recommendations: Recommend outpatient physical therapy services to address higher level deficits      Subjective    \"I want to go home\"     Objective       05/30/21 0930   Precautions   Precautions Fall Risk;Spinal / Back Precautions ;Non Weight Bearing Right Upper Extremity   Comments  at all times, [place] collar for showers    Pain 0 - 10 Group   Location Chest   Pain Rating Scale (NPRS) 4   Comfort Goal Comfort with Movement   Therapist Pain Assessment Post Activity Pain Same as Prior to Activity   Cognition    Cognition / Consciousness WDL   Level of Consciousness Alert   Comments very pleasant and  motivated    Active ROM Lower Body    Active ROM Lower Body  WDL   Strength Lower Body   Lower Body Strength  WDL   Sensation " Lower Body   Lower Extremity Sensation   WDL   Sitting Lower Body Exercises   Sitting Lower Body Exercises Yes   Ankle Pumps 1 set of 10;Bilateral   Long Arc Quad 1 set of 10;Bilateral   Comments pre-gait warm up    Standing Lower Body Exercises   Standing Lower Body Exercises Yes   Marching 1 set of 10   Comments pre-gait warm-up    Neuro-Muscular Treatments   Neuro-Muscular Treatments Postural Facilitation;Verbal Cuing;Tactile Cuing   Other Treatments   Other Treatments Provided education about spine precautions and safety with mobility, as well as needed DME and education about not sitting for hours at time   Balance   Sitting Balance (Static) Good   Sitting Balance (Dynamic) Fair +   Standing Balance (Static) Fair   Standing Balance (Dynamic) Fair   Weight Shift Sitting Good   Weight Shift Standing Fair   Skilled Intervention Postural Facilitation;Sequencing;Compensatory Strategies   Comments no AD   Gait Analysis   Gait Level Of Assist Supervised   Assistive Device None   Distance (Feet) 400   # of Times Distance was Traveled 1   Deviation Bradykinetic   # of Stairs Climbed 2   Level of Assist with Stairs Supervised   Weight Bearing Status NWB R UE   Skilled Intervention Verbal Cuing;Tactile Cuing   Comments no overt LOB, eduction about self-pacing and compensatory strategies for decreased visual scanning capabilities    Bed Mobility    Supine to Sit Supervised   Scooting Supervised   Comments Has a hospital bed at home and is able to maintain spine precautions with a pivot to EOB    Functional Mobility   Sit to Stand Supervised   Bed, Chair, Wheelchair Transfer Supervised   Toilet Transfers Supervised   Transfer Method Stand Step   Skilled Intervention Compensatory Strategies   How much difficulty does the patient currently have...   Turning over in bed (including adjusting bedclothes, sheets and blankets)? 4   Sitting down on and standing up from a chair with arms (e.g., wheelchair, bedside commode, etc.) 4    Moving from lying on back to sitting on the side of the bed? 4   How much help from another person does the patient currently need...   Moving to and from a bed to a chair (including a wheelchair)? 4   Need to walk in a hospital room? 3   Climbing 3-5 steps with a railing? 3   6 clicks Mobility Score 22   Activity Tolerance   Sitting in Chair post session    Sitting Edge of Bed 10 min    Standing 10 min    Patient / Family Goals    Patient / Family Goal #1 heal and go home   Goal #1 Outcome Goal not met   Short Term Goals    Short Term Goal # 1 Pt will demo bed mobility SPV in 6 visits to return home safely   Goal Outcome # 1 Goal met   Short Term Goal # 2 Pt will demo transfers with SPV in 6 visits to return home safely   Goal Outcome # 2 Goal met   Short Term Goal # 3 Pt will ambulate 150 feet with LRAD and SPV in 6 visits to return home safely    Goal Outcome # 3 Goal met   Short Term Goal # 4 Pt will manage 2 steps with HHA/EUGENE in 6 visits to return home safely    Goal Outcome # 4 Goal met   Anticipated Discharge Equipment and Recommendations   DC Equipment Recommendations Tub / Shower Seat;Bed Side Commode   Discharge Recommendations Recommend outpatient physical therapy services to address higher level deficits     Aviva Mora, PT, DPT, [facility]

## 2021-05-30 NOTE — THERAPY
Occupational Therapy  Daily Treatment     Patient Name: Rafael Perla  Age:  35 y.o., Sex:  male  Medical Record #: [id number]  Today's Date: 5/30/2021     Precautions  Precautions: (P) Fall Risk, Spinal / Back Precautions   Comments: (P)  at all times, except to shower, hard collar during showering    Assessment    Reviewed , hard collar don/ doff with patient spouse and family. reviewed pad changing. Spouse able to return demo with occasional cues. All questions answered. Reviewed DME needs.moving slowly but steadily, improved since last session. Decline need for home health, advised that if needs change their primary care can order after DC    Plan    Continue current treatment plan. may DC home today with family assist    DC Equipment Recommendations: (P) Tub / Shower Seat  Discharge Recommendations: (P) Recommend outpatient occupational therapy services to address higher level deficits    Subjective    Pleasant and cooperative, motivated for home. All questions answered     Objective       05/30/21 1240   Total Time Spent   Total Time Spent (Mins)    Treatment Charges   OT Self Care / ADL 3   Precautions   Precautions Fall Risk;Spinal / Back Precautions    Comments  at all times, except to shower, hard collar during showering   Vitals   O2 Delivery Device None - Room Air   Pain 0 - 10 Group   Therapist Pain Assessment Post Activity Pain Same as Prior to Activity;Nurse Notified  (did not rate)   Cognition    Cognition / Consciousness WDL   Level of Consciousness Alert   Comments pleasant, cooperative, motivated for participation/ progress   Active ROM Upper Body   Dominant Hand Right   Comments both shoulders limited to 110, right hand in soft cast to elbow   Strength Upper Body   Comments limited bilaterally   Sensation Upper Body   Upper Extremity Sensation  WDL   Upper Body Muscle Tone   Upper Body Muscle Tone  WDL   Other Treatments   Other Treatments Provided extensive education to patient and family  surrounding  and hard collar don/doff, change of pads, precautions during ADLs. all verbalise understanding of education provided. spouse able to provide return leanna gonzalez occasional cues.   Balance   Sitting Balance (Static) Good   Sitting Balance (Dynamic) Fair +   Standing Balance (Static) Fair   Standing Balance (Dynamic) Fair   Weight Shift Sitting Good   Weight Shift Standing Fair   Comments without AD   Bed Mobility    Supine to Sit Supervised   Sit to Supine   (left seated EOB)   Scooting Supervised   Skilled Intervention Compensatory Strategies;Verbal Cuing   Activities of Daily Living   Eating Supervision   Grooming Supervision;Seated   Upper Body Dressing Moderate Assist  (AVERY barkerO. spouse able to assist)   Lower Body Dressing Maximal Assist   Skilled Intervention Compensatory Strategies;Verbal Cuing;Tactile Cuing   How much help from another person does the patient currently need...   Putting on and taking off regular lower body clothing? 2   Bathing (including washing, rinsing, and drying)? 2   Toileting, which includes using a toilet, bedpan, or urinal? 3   Putting on and taking off regular upper body clothing? 2   Taking care of personal grooming such as brushing teeth? 3   Eating meals? 3   6 Clicks Daily Activity Score 15   Functional Mobility   Sit to Stand Supervised   Bed, Chair, Wheelchair Transfer Supervised   Toilet Transfers Supervised   Transfer Method Stand Pivot   Visual Perception   Visual Perception  WDL   Activity Tolerance   Sitting Edge of Bed 20 mins   Patient / Family Goals   Patient / Family Goal #1 To have less pain   Goal #1 Outcome Progressing as expected   Short Term Goals   Short Term Goal # 1 Pt will dress UE with Ena   Goal Outcome # 1 Progressing as expected   Short Term Goal # 2 Pt will direct his wife to don/doff  correctly   Goal Outcome # 2 Progressing as expected   Short Term Goal # 3 Pt/wife will demo how to change out pads on    Goal Outcome # 3  Progressing as expected   Short Term Goal # 5 Pt will complete LB dressing with supv   (5/27 new goal )   Goal Outcome # 5 Progressing as expected   Education Group   Role of Occupational Therapist Patient Response Patient;Significant Other;Family;Acceptance;Explanation;Demonstration;Verbal Demonstration;Action Demonstration   Back Safety Patient Response Patient;Family;Significant Other;Acceptance;Explanation;Demonstration;Verbal Demonstration;Action Demonstration   ADL Patient Response Patient;Significant Other;Family;Acceptance;Explanation;Demonstration;Verbal Demonstration;Action Demonstration   Adaptive Equipment Patient Response Patient;Significant Other;Acceptance;Explanation;Demonstration;Verbal Demonstration;Action Demonstration   Anticipated Discharge Equipment and Recommendations   DC Equipment Recommendations Tub / Shower Seat   Discharge Recommendations Recommend outpatient occupational therapy services to address higher level deficits   Interdisciplinary Plan of Care Collaboration   IDT Collaboration with  Nursing;Physical Therapist;Physician Assistant;Family / Caregiver   Patient Position at End of Therapy Other (Comments)  (in shower with CNA and spouse)   Collaboration Comments report given   Session Information   Date / Session Number  5/30,3 ( 3/3,5/30)   Priority 3

## 2021-05-30 NOTE — CARE PLAN
The patient is Stable - Low risk of patient condition declining or worsening    Shift Goals  Clinical Goals: pain control and ambulate  Patient Goals: Remove the chest tube, floor with shower  Family Goals: Transfer to the floor with bathroom and shower    Progress made toward(s) clinical / shift goals:  ambulates with min assist. Pain still at 8/10. Requiring [id number] q3 hours    Patient is not progressing towards the following goals:    Problem: Pain - Standard  Goal: Alleviation of pain or a reduction in pain to the patient’s comfort goal  Outcome: Progressing     Problem: Respiratory  Goal: Patient will achieve/maintain optimum respiratory ventilation and gas exchange  Outcome: Progressing

## 2021-08-16 ENCOUNTER — HOSPITAL ENCOUNTER (OUTPATIENT)
Dept: RADIOLOGY | Facility: MEDICAL CENTER | Age: 36
End: 2021-08-16
Attending: PHYSICIAN ASSISTANT
Payer: COMMERCIAL

## 2021-08-16 DIAGNOSIS — S12.030D CLOSED DISPLACED FRACTURE OF POSTERIOR ARCH OF FIRST CERVICAL VERTEBRA WITH ROUTINE HEALING, SUBSEQUENT ENCOUNTER: ICD-10-CM

## 2021-08-16 PROCEDURE — 72125 CT NECK SPINE W/O DYE: CPT

## 2021-09-24 ENCOUNTER — APPOINTMENT (OUTPATIENT)
Dept: RADIOLOGY | Facility: MEDICAL CENTER | Age: 36
End: 2021-09-24
Attending: ORTHOPAEDIC SURGERY
Payer: COMMERCIAL

## 2021-10-19 ENCOUNTER — HOSPITAL ENCOUNTER (OUTPATIENT)
Dept: RADIOLOGY | Facility: MEDICAL CENTER | Age: 36
End: 2021-10-19
Attending: ORTHOPAEDIC SURGERY
Payer: COMMERCIAL

## 2021-10-19 DIAGNOSIS — M25.512 LEFT SHOULDER PAIN, UNSPECIFIED CHRONICITY: ICD-10-CM

## 2021-10-19 PROCEDURE — 73222 MRI JOINT UPR EXTREM W/DYE: CPT | Mod: LT

## 2021-10-19 PROCEDURE — 700117 HCHG RX CONTRAST REV CODE 255: Performed by: ORTHOPAEDIC SURGERY

## 2021-10-19 PROCEDURE — A9576 INJ PROHANCE MULTIPACK: HCPCS | Performed by: ORTHOPAEDIC SURGERY

## 2021-10-19 PROCEDURE — 77002 NEEDLE LOCALIZATION BY XRAY: CPT | Mod: LT

## 2021-10-19 RX ORDER — LIDOCAINE HYDROCHLORIDE 10 MG/ML
INJECTION, SOLUTION INFILTRATION; PERINEURAL
Status: DISCONTINUED
Start: 2021-10-19 | End: 2021-10-20 | Stop reason: HOSPADM

## 2021-10-19 RX ADMIN — GADOTERIDOL 0.1 ML: 279.3 INJECTION, SOLUTION INTRAVENOUS at 13:51

## 2021-10-19 RX ADMIN — IOHEXOL 5 ML: 300 INJECTION, SOLUTION INTRAVENOUS at 13:51

## 2023-07-27 ENCOUNTER — OFFICE VISIT (OUTPATIENT)
Dept: URGENT CARE | Facility: CLINIC | Age: 38
End: 2023-07-27
Payer: COMMERCIAL

## 2023-07-27 VITALS
DIASTOLIC BLOOD PRESSURE: 70 MMHG | SYSTOLIC BLOOD PRESSURE: 100 MMHG | WEIGHT: 180 LBS | HEART RATE: 70 BPM | TEMPERATURE: 98.9 F | BODY MASS INDEX: 22.38 KG/M2 | RESPIRATION RATE: 16 BRPM | OXYGEN SATURATION: 98 % | HEIGHT: 75 IN

## 2023-07-27 DIAGNOSIS — R68.89 FLU-LIKE SYMPTOMS: ICD-10-CM

## 2023-07-27 DIAGNOSIS — U07.1 COVID-19: ICD-10-CM

## 2023-07-27 LAB
FLUAV RNA SPEC QL NAA+PROBE: NEGATIVE
FLUBV RNA SPEC QL NAA+PROBE: NEGATIVE
RSV RNA SPEC QL NAA+PROBE: NEGATIVE
SARS-COV-2 RNA RESP QL NAA+PROBE: POSITIVE

## 2023-07-27 PROCEDURE — 3078F DIAST BP <80 MM HG: CPT | Performed by: PHYSICIAN ASSISTANT

## 2023-07-27 PROCEDURE — 0241U POCT CEPHEID COV-2, FLU A/B, RSV - PCR: CPT | Performed by: PHYSICIAN ASSISTANT

## 2023-07-27 PROCEDURE — 99203 OFFICE O/P NEW LOW 30 MIN: CPT | Performed by: PHYSICIAN ASSISTANT

## 2023-07-27 PROCEDURE — 3074F SYST BP LT 130 MM HG: CPT | Performed by: PHYSICIAN ASSISTANT

## 2023-07-27 RX ORDER — METHOCARBAMOL 500 MG/1
1 TABLET, FILM COATED ORAL 3 TIMES DAILY
COMMUNITY

## 2023-07-27 RX ORDER — METHOCARBAMOL 750 MG/1
1 TABLET, FILM COATED ORAL 3 TIMES DAILY
COMMUNITY

## 2023-07-27 RX ORDER — CYCLOBENZAPRINE HCL 10 MG
TABLET ORAL
COMMUNITY

## 2023-07-27 RX ORDER — OXYCODONE HYDROCHLORIDE 5 MG/1
TABLET ORAL
COMMUNITY

## 2023-07-27 RX ORDER — OXYCODONE HYDROCHLORIDE AND ACETAMINOPHEN 5; 325 MG/1; MG/1
1 TABLET ORAL
COMMUNITY

## 2023-07-27 ASSESSMENT — ENCOUNTER SYMPTOMS: CHILLS: 1

## 2023-07-27 ASSESSMENT — VISUAL ACUITY: OU: 1

## 2023-07-27 NOTE — PROGRESS NOTES
"Subjective:   Rafael Perla is a 37 y.o. male who presents for Chills (Fever, couple of days)     10 days ago developed onset of URI symptoms, nasal congestion which has subsequently resolved  Yesterday developed new onset body aches, myalgias, chills, tactile fever yesterday  Better with ibu  Went golfing, significant sweating and myalgias today prompting evaluation  Mild cough, no associated shortness of breath  No underlying respiratory illnesses  Denies sore throat  Denies purulent nasal discharge, sinus pressure, sinus headaches      Chills  Associated symptoms include chills.         Review of Systems   Constitutional:  Positive for chills.       Medications:  acetaminophen Tabs  amoxicillin  cyclobenzaprine Tabs  diclofenac sodium Gel  lidocaine Ptch  methocarbamol Tabs  oxyCODONE immediate-release Tabs  oxyCODONE-acetaminophen Tabs    Allergies:             Other drug    Surgical History:         Past Surgical History:   Procedure Laterality Date    PERCUTANEOUS PINNING Right 5/27/2021    Procedure: PINNING, FRACTURE, PERCUTANEOUS RIGHT 5TH METACARPAL;  Surgeon: Guzman Hernandez M.D.;  Location: SURGERY [facility];  Service: Orthopedics    FULL THICKNESS SKIN GRAFT  2002       Past Social Hx:  Rafael Perla  reports that he has never smoked. He has never used smokeless tobacco. He reports current alcohol use. He reports current drug use.     Past Family Hx:   Rafael Perla family history is not on file.       Problem list, medications, and allergies reviewed by myself today in Epic.     Objective:     /70 (BP Location: Left arm, Patient Position: Sitting, BP Cuff Size: Adult)   Pulse 70   Temp 37.2 °C (98.9 °F) (Temporal)   Resp 16   Ht 1.905 m (6' 3\")   Wt 81.6 kg (180 lb)   SpO2 98%   BMI 22.50 kg/m²     Physical Exam  Vitals and nursing note reviewed.   Constitutional:       General: He is not in acute distress.     Appearance: He is well-developed. He is ill-appearing. He is not " toxic-appearing or diaphoretic.   HENT:      Head: Normocephalic. No right periorbital erythema or left periorbital erythema.      Right Ear: Ear canal and external ear normal. No mastoid tenderness. Tympanic membrane is injected. Tympanic membrane is not perforated or bulging.      Left Ear: Ear canal and external ear normal. No mastoid tenderness. Tympanic membrane is injected. Tympanic membrane is not perforated or bulging.      Nose: Mucosal edema and rhinorrhea present.      Mouth/Throat:      Mouth: Mucous membranes are dry.      Pharynx: Uvula midline. Posterior oropharyngeal erythema present. No uvula swelling.   Eyes:      General: Vision grossly intact. No allergic shiner.     Conjunctiva/sclera: Conjunctivae normal.      Pupils: Pupils are equal, round, and reactive to light.   Cardiovascular:      Rate and Rhythm: Normal rate and regular rhythm.      Pulses: Normal pulses.      Heart sounds: Normal heart sounds. No murmur heard.  Pulmonary:      Effort: Pulmonary effort is normal. No tachypnea, accessory muscle usage, prolonged expiration or respiratory distress.      Breath sounds: Normal breath sounds and air entry. No decreased air movement. No decreased breath sounds, wheezing, rhonchi or rales.      Comments: Lungs clear to auscultation bilaterally, no rhonchi rales or wheezes  Musculoskeletal:         General: Normal range of motion.      Cervical back: Normal range of motion and neck supple. No rigidity.   Lymphadenopathy:      Cervical: No cervical adenopathy.   Skin:     General: Skin is warm and dry.   Neurological:      Mental Status: He is alert and oriented to person, place, and time.   Psychiatric:         Behavior: Behavior is cooperative.       Results for orders placed or performed in visit on 07/27/23   POCT CoV-2, Flu A/B, RSV by PCR   Result Value Ref Range    SARS-CoV-2 by PCR Positive (A) Negative, Invalid    Influenza virus A RNA Negative Negative, Invalid    Influenza virus B, PCR  Negative Negative, Invalid    RSV, PCR Negative Negative, Invalid         Assessment/Plan:     Diagnosis and Associated Orders:     1. Flu-like symptoms  - POCT CoV-2, Flu A/B, RSV by PCR    2. COVID-19    Other orders  - cyclobenzaprine (FLEXERIL) 10 mg Tab; TAKE ONE-HALF TO 1 TABLET BY MOUTH EVERY NIGHT AT BEDTIME (Patient not taking: Reported on 7/27/2023)  - diclofenac sodium (VOLTAREN) 1 % Gel;  (Patient not taking: Reported on 7/27/2023)  - methocarbamol (ROBAXIN) 500 MG Tab; Take 1 Tablet by mouth 3 times a day. (Patient not taking: Reported on 7/27/2023)  - methocarbamol (ROBAXIN) 750 MG Tab; Take 1 Tablet by mouth 3 times a day. (Patient not taking: Reported on 7/27/2023)  - oxyCODONE immediate-release (ROXICODONE) 5 MG Tab; TAKE 1 TO 2 TABLETS BY MOUTH FOUR TIMES DAILY (Patient not taking: Reported on 7/27/2023)  - oxyCODONE-acetaminophen (PERCOCET) 5-325 MG Tab; Take 1 Tablet by mouth 6 Times a Day. (Patient not taking: Reported on 7/27/2023)        Comments/MDM:   Covid testing in clinic was positive.  Results were communicated to patient via [email or web address].  At this point the patient appears to be hemodynamically stable without evidence of hypoxia or endorgan injury. I discussed with her the possibility of decompensation and to monitor symptoms closely. Consider pulse oximetry and ER presentation if oximetry dips below 90%.  We discussed self isolation and ER precautions.  Patient should to proceed to ED for development of symptoms including but not limited to shortness of breath breath, respiratory distress, or worsening symptoms not manageable at home.  I instructed the patient to try to follow up with their PCP (if applicable) for follow up care.  If requested, I provided the patient with a work note to provide to their employer or school regarding returning to work and discontinuation of self isolation.  Symptomatic and supportive care:   Plenty of oral hydration and rest   Over the counter cough  suppressant as directed.  Tylenol or ibuprofen for pain and fever as directed.   Warm salt water gargles for sore throat, soft foods, cool liquids.   Saline nasal spray and Flonase as a decongestant.   Infection control measures at home. Stay away from people, Hand washing, covering sneeze/cough, disinfect surfaces.   Remain home from work, school, and other populated environments.  Follow CDC guidelines regarding quarantine.  All questions were answered and patient demonstrated verbal understanding of above.    I personally reviewed prior external notes and test results pertinent to today's visit. Supportive care, natural history, differential diagnoses, and indications for immediate follow-up discussed. Return to clinic or go to ED if symptoms worsen or persist.  Red flag symptoms discussed.  Patient/Parent/Guardian voices understanding. Follow-up with your primary care provider in 3-5 days.  All side effects of medication discussed including allergic response, GI upset, tendon injury, rash, sedation etc    Please note that this dictation was created using voice recognition software. I have made a reasonable attempt to correct obvious errors, but I expect that there are errors of grammar and possibly content that I did not discover before finalizing the note.    This note was electronically signed by Deena Kimball PA-C

## 2025-06-17 ENCOUNTER — RESULTS FOLLOW-UP (OUTPATIENT)
Dept: URGENT CARE | Facility: CLINIC | Age: 40
End: 2025-06-17

## 2025-06-17 ENCOUNTER — OFFICE VISIT (OUTPATIENT)
Dept: URGENT CARE | Facility: CLINIC | Age: 40
End: 2025-06-17
Payer: COMMERCIAL

## 2025-06-17 VITALS
OXYGEN SATURATION: 99 % | RESPIRATION RATE: 16 BRPM | TEMPERATURE: 97.5 F | HEIGHT: 75 IN | BODY MASS INDEX: 23.87 KG/M2 | SYSTOLIC BLOOD PRESSURE: 116 MMHG | DIASTOLIC BLOOD PRESSURE: 70 MMHG | HEART RATE: 68 BPM | WEIGHT: 192 LBS

## 2025-06-17 DIAGNOSIS — K12.2 UVULITIS: Primary | ICD-10-CM

## 2025-06-17 LAB — S PYO DNA SPEC NAA+PROBE: NOT DETECTED

## 2025-06-17 PROCEDURE — 87651 STREP A DNA AMP PROBE: CPT | Performed by: STUDENT IN AN ORGANIZED HEALTH CARE EDUCATION/TRAINING PROGRAM

## 2025-06-17 PROCEDURE — 99213 OFFICE O/P EST LOW 20 MIN: CPT | Performed by: STUDENT IN AN ORGANIZED HEALTH CARE EDUCATION/TRAINING PROGRAM

## 2025-06-17 RX ORDER — MINOXIDIL 2.5 MG/1
2.5 TABLET ORAL DAILY
COMMUNITY

## 2025-06-17 RX ORDER — DEXAMETHASONE SODIUM PHOSPHATE 10 MG/ML
10 INJECTION, SOLUTION INTRA-ARTICULAR; INTRALESIONAL; INTRAMUSCULAR; INTRAVENOUS; SOFT TISSUE ONCE
Status: COMPLETED | OUTPATIENT
Start: 2025-06-17 | End: 2025-06-17

## 2025-06-17 RX ADMIN — DEXAMETHASONE SODIUM PHOSPHATE 10 MG: 10 INJECTION, SOLUTION INTRA-ARTICULAR; INTRALESIONAL; INTRAMUSCULAR; INTRAVENOUS; SOFT TISSUE at 09:22

## 2025-06-17 NOTE — PROGRESS NOTES
Subjective:   CHIEF COMPLAINT  Chief Complaint   Patient presents with    Sore Throat     Started last night       HPI    History of Present Illness  Rafael Perla is a 39 y.o. male who presents for evaluation of a sore throat.    He reports discomfort in his throat, which he suspects to be tonsillitis. The onset of symptoms was last night, characterized by a sensation of swelling in the back of his throat. He does not report any wheezing or difficulty in exhalation. His sleep was disrupted last night due to the severity of the pain, which also induced snoring and choking sensations. He did not take any over-the-counter analgesics such as ibuprofen, Motrin, or Aleve. He has a past medical history of strep throat but notes that the current symptoms are dissimilar. His daughter is currently experiencing nasal congestion (she is in ).        REVIEW OF SYSTEMS  General: no fever or chills  GI: no nausea or vomiting  See HPI for further details.    PAST MEDICAL HISTORY  Patient Active Problem List    Diagnosis Date Noted    Trauma 05/23/2021    Pneumohemothorax, traumatic, initial encounter 05/23/2021    Fracture of one rib, left side, initial encounter for closed fracture 05/23/2021    Fracture of manubrium, initial encounter for closed fracture 05/23/2021    Mult fractures of thoracic spine, closed, initial encounter ([facility]) 05/23/2021    Multiple fractures of cervical spine, initial encounter ([facility]) 05/23/2021    Closed fracture of fifth metacarpal bone of right hand, initial encounter 05/23/2021    Injury of vertebral artery, left, initial encounter 05/23/2021       SURGICAL HISTORY   has a past surgical history that includes full thickness skin graft (2002) and percutaneous pinning (Right, 5/27/2021).    ALLERGIES  Allergies[1]    CURRENT MEDICATIONS  Home Medications       Reviewed by Casey Live D.O. (Physician) on 06/17/25 at 0856  Med List Status: <None>     Medication Last Dose Status   acetaminophen  "(TYLENOL) 500 MG Tab Not Taking Active   amoxicillin (AMOXIL) 400 MG/5ML suspension Not Taking Active   cyclobenzaprine (FLEXERIL) 10 mg Tab Not Taking Active   diclofenac sodium (VOLTAREN) 1 % Gel Not Taking Active   lidocaine (LIDODERM) 5 % Patch Not Taking Active   methocarbamol (ROBAXIN) 500 MG Tab Not Taking Active   methocarbamol (ROBAXIN) 750 MG Tab Not Taking Active   minoxidil (LONITEN) 2.5 MG Tab Taking Active   oxyCODONE immediate-release (ROXICODONE) 5 MG Tab Not Taking Active   oxyCODONE-acetaminophen (PERCOCET) 5-325 MG Tab Not Taking Active                    SOCIAL HISTORY  Social History     Tobacco Use    Smoking status: Never    Smokeless tobacco: Never   Substance and Sexual Activity    Alcohol use: Yes     Comment: weekends    Drug use: Yes     Comment: marijuana    Sexual activity: Not on file       FAMILY HISTORY  History reviewed. No pertinent family history.       Objective:   PHYSICAL EXAM  VITAL SIGNS: /70 (BP Location: Left arm, Patient Position: Sitting, BP Cuff Size: Adult long)   Pulse 68   Temp 36.4 °C (97.5 °F) (Temporal)   Resp 16   Ht 1.905 m (6' 3\")   Wt 87.1 kg (192 lb)   SpO2 99%   BMI 24.00 kg/m²     Gen: no acute distress, normal voice  Skin: dry, intact, moist mucosal membranes  Eyes: No conjunctival injection b/l  Neck: Normal range of motion. No meningeal signs.   ENT: Edema of the uvula without any significant erythema.  No posterior oropharyngeal erythema or exudates.  Uvula midline.  No lymphadenopathy.  Lungs: No increased work of breathing.  CTAB w/ symmetric expansion  CV: RRR w/o murmurs or clicks  Psych: normal affect, normal judgement, alert, awake    Assessment/Plan:     1. Uvulitis  dexamethasone (Decadron) injection (check route below) 10 mg    POCT CEPHEID GROUP A STREP - PCR      POC test was negative; likely viral etiology.  Patient was otherwise well-appearing without any signs of peritonsillar abscess or respiratory compromise.  -Decadron 10 mg " p.o. x 1 given in clinic.  Side effects discussed  -Ibuprofen 800 tid as needed for symptomatic relief  -Return to urgent care any new/worsening symptoms or further questions or concerns.  Patient understood everything discussed.  All questions were answered.          Please note that this dictation was created using voice recognition software. I have made a reasonable attempt to correct obvious errors, but I expect that there are errors of grammar and possibly content that I did not discover before finalizing the note.                [1]   Allergies  Allergen Reactions    Other Drug

## (undated) DEVICE — BANDAGE ELASTIC 4 HONEYCOMB - 4"X5YD LF (20/CA)"

## (undated) DEVICE — SET EXTENSION WITH 2 PORTS (48EA/CA) ***PART #2C8610 IS A SUBSTITUTE*****

## (undated) DEVICE — SPLINT PLASTER 3 IN X 15 IN - (50/BX 12BX/CA)

## (undated) DEVICE — SPLINT PLASTER 4 IN  X 15 IN - (50/BX 12BX/CA)

## (undated) DEVICE — KIT ANESTHESIA W/CIRCUIT & 3/LT BAG W/FILTER (20EA/CA)

## (undated) DEVICE — SODIUM CHL IRRIGATION 0.9% 1000ML (12EA/CA)

## (undated) DEVICE — GOWN WARMING STANDARD FLEX - (30/CA)

## (undated) DEVICE — MASK ANESTHESIA ADULT  - (100/CA)

## (undated) DEVICE — LACTATED RINGERS INJ 1000 ML - (14EA/CA 60CA/PF)

## (undated) DEVICE — GLOVE BIOGEL INDICATOR SZ 8 SURGICAL PF LTX - (50/BX 4BX/CA)

## (undated) DEVICE — PACK UPPER EXTREMITY (2EA/CA)

## (undated) DEVICE — Device

## (undated) DEVICE — PROTECTOR ULNA NERVE - (36PR/CA)

## (undated) DEVICE — GLOVE BIOGEL SZ 8 SURGICAL PF LTX - (50PR/BX 4BX/CA)

## (undated) DEVICE — SENSOR SPO2 NEO LNCS ADHESIVE (20/BX) SEE USER NOTES

## (undated) DEVICE — ELECTRODE 850 FOAM ADHESIVE - HYDROGEL RADIOTRNSPRNT (50/PK)

## (undated) DEVICE — SUTURE GENERAL

## (undated) DEVICE — SUCTION INSTRUMENT YANKAUER BULBOUS TIP W/O VENT (50EA/CA)

## (undated) DEVICE — TUBING CLEARLINK DUO-VENT - C-FLO (48EA/CA)

## (undated) DEVICE — SUTURE 4-0 ETHILON FS-1 18 (36PK/BX)"

## (undated) DEVICE — SUTURE 4-0 ETHILON PS-2 18 (12PK/BX)"

## (undated) DEVICE — HEAD HOLDER JUNIOR/ADULT

## (undated) DEVICE — SPLINT OCL

## (undated) DEVICE — CANISTER SUCTION 3000ML MECHANICAL FILTER AUTO SHUTOFF MEDI-VAC NONSTERILE LF DISP  (40EA/CA)

## (undated) DEVICE — NEPTUNE 4 PORT MANIFOLD - (20/PK)

## (undated) DEVICE — ELECTRODE DUAL RETURN W/ CORD - (50/PK)

## (undated) DEVICE — GLOVE BIOGEL PI INDICATOR SZ 8.5 SURGICAL PF LF - (50PR/BX 4BX/CA)

## (undated) DEVICE — CHLORAPREP 26 ML APPLICATOR - ORANGE TINT(25/CA)

## (undated) DEVICE — TOWEL STOP TIMEOUT SAFETY FLAG (40EA/CA)

## (undated) DEVICE — PADDING CAST 4 IN STERILE - 4 X 4 YDS (24/CA)

## (undated) DEVICE — DRAPE C-ARM LARGE 41IN X 74 IN - (10/BX 2BX/CA)

## (undated) DEVICE — SET LEADWIRE 5 LEAD BEDSIDE DISPOSABLE ECG (1SET OF 5/EA)

## (undated) DEVICE — PAD PREP 24 X 48 CUFFED - (100/CA)